# Patient Record
Sex: FEMALE | Race: ASIAN | NOT HISPANIC OR LATINO | URBAN - METROPOLITAN AREA
[De-identification: names, ages, dates, MRNs, and addresses within clinical notes are randomized per-mention and may not be internally consistent; named-entity substitution may affect disease eponyms.]

---

## 2020-12-21 ENCOUNTER — INPATIENT (INPATIENT)
Facility: HOSPITAL | Age: 65
LOS: 14 days | Discharge: HOME HEALTH | DRG: 949 | End: 2021-01-05
Attending: PHYSICAL MEDICINE & REHABILITATION | Admitting: PHYSICAL MEDICINE & REHABILITATION
Payer: MEDICARE

## 2020-12-21 VITALS
OXYGEN SATURATION: 96 % | WEIGHT: 117.29 LBS | DIASTOLIC BLOOD PRESSURE: 69 MMHG | RESPIRATION RATE: 16 BRPM | SYSTOLIC BLOOD PRESSURE: 119 MMHG | TEMPERATURE: 98 F | HEART RATE: 76 BPM

## 2020-12-21 DIAGNOSIS — I63.9 CEREBRAL INFARCTION, UNSPECIFIED: ICD-10-CM

## 2020-12-21 RX ORDER — DEXTROSE 50 % IN WATER 50 %
25 SYRINGE (ML) INTRAVENOUS ONCE
Refills: 0 | Status: DISCONTINUED | OUTPATIENT
Start: 2020-12-21 | End: 2021-01-05

## 2020-12-21 RX ORDER — SODIUM CHLORIDE 9 MG/ML
1000 INJECTION, SOLUTION INTRAVENOUS
Refills: 0 | Status: DISCONTINUED | OUTPATIENT
Start: 2020-12-21 | End: 2021-01-05

## 2020-12-21 RX ORDER — TICAGRELOR 90 MG/1
90 TABLET ORAL
Refills: 0 | Status: DISCONTINUED | OUTPATIENT
Start: 2020-12-21 | End: 2021-01-05

## 2020-12-21 RX ORDER — LEVOTHYROXINE SODIUM 125 MCG
75 TABLET ORAL DAILY
Refills: 0 | Status: DISCONTINUED | OUTPATIENT
Start: 2020-12-21 | End: 2021-01-05

## 2020-12-21 RX ORDER — INSULIN GLARGINE 100 [IU]/ML
12 INJECTION, SOLUTION SUBCUTANEOUS AT BEDTIME
Refills: 0 | Status: DISCONTINUED | OUTPATIENT
Start: 2020-12-21 | End: 2020-12-22

## 2020-12-21 RX ORDER — DEXTROSE 50 % IN WATER 50 %
12.5 SYRINGE (ML) INTRAVENOUS ONCE
Refills: 0 | Status: DISCONTINUED | OUTPATIENT
Start: 2020-12-21 | End: 2021-01-05

## 2020-12-21 RX ORDER — DEXTROSE 50 % IN WATER 50 %
15 SYRINGE (ML) INTRAVENOUS ONCE
Refills: 0 | Status: DISCONTINUED | OUTPATIENT
Start: 2020-12-21 | End: 2021-01-05

## 2020-12-21 RX ORDER — GLUCAGON INJECTION, SOLUTION 0.5 MG/.1ML
1 INJECTION, SOLUTION SUBCUTANEOUS ONCE
Refills: 0 | Status: DISCONTINUED | OUTPATIENT
Start: 2020-12-21 | End: 2021-01-05

## 2020-12-21 RX ORDER — INSULIN LISPRO 100/ML
4 VIAL (ML) SUBCUTANEOUS
Refills: 0 | Status: DISCONTINUED | OUTPATIENT
Start: 2020-12-21 | End: 2020-12-22

## 2020-12-21 RX ORDER — ASPIRIN/CALCIUM CARB/MAGNESIUM 324 MG
81 TABLET ORAL DAILY
Refills: 0 | Status: DISCONTINUED | OUTPATIENT
Start: 2020-12-21 | End: 2021-01-05

## 2020-12-21 RX ORDER — AMLODIPINE BESYLATE 2.5 MG/1
5 TABLET ORAL DAILY
Refills: 0 | Status: DISCONTINUED | OUTPATIENT
Start: 2020-12-21 | End: 2020-12-25

## 2020-12-21 RX ORDER — LISINOPRIL 2.5 MG/1
40 TABLET ORAL DAILY
Refills: 0 | Status: DISCONTINUED | OUTPATIENT
Start: 2020-12-21 | End: 2021-01-05

## 2020-12-21 RX ORDER — INSULIN LISPRO 100/ML
VIAL (ML) SUBCUTANEOUS
Refills: 0 | Status: DISCONTINUED | OUTPATIENT
Start: 2020-12-21 | End: 2020-12-23

## 2020-12-21 RX ORDER — PANTOPRAZOLE SODIUM 20 MG/1
40 TABLET, DELAYED RELEASE ORAL
Refills: 0 | Status: DISCONTINUED | OUTPATIENT
Start: 2020-12-21 | End: 2021-01-05

## 2020-12-21 RX ORDER — ACETAMINOPHEN 500 MG
650 TABLET ORAL EVERY 6 HOURS
Refills: 0 | Status: DISCONTINUED | OUTPATIENT
Start: 2020-12-21 | End: 2021-01-05

## 2020-12-21 RX ORDER — POLYETHYLENE GLYCOL 3350 17 G/17G
17 POWDER, FOR SOLUTION ORAL DAILY
Refills: 0 | Status: DISCONTINUED | OUTPATIENT
Start: 2020-12-21 | End: 2021-01-05

## 2020-12-21 RX ORDER — ALBUTEROL 90 UG/1
1 AEROSOL, METERED ORAL EVERY 4 HOURS
Refills: 0 | Status: DISCONTINUED | OUTPATIENT
Start: 2020-12-21 | End: 2021-01-05

## 2020-12-21 RX ORDER — SENNA PLUS 8.6 MG/1
2 TABLET ORAL AT BEDTIME
Refills: 0 | Status: DISCONTINUED | OUTPATIENT
Start: 2020-12-21 | End: 2021-01-05

## 2020-12-21 RX ORDER — ATORVASTATIN CALCIUM 80 MG/1
80 TABLET, FILM COATED ORAL AT BEDTIME
Refills: 0 | Status: DISCONTINUED | OUTPATIENT
Start: 2020-12-21 | End: 2021-01-05

## 2020-12-21 RX ORDER — CIPROFLOXACIN LACTATE 400MG/40ML
500 VIAL (ML) INTRAVENOUS EVERY 12 HOURS
Refills: 0 | Status: DISCONTINUED | OUTPATIENT
Start: 2020-12-21 | End: 2020-12-22

## 2020-12-21 RX ORDER — ENOXAPARIN SODIUM 100 MG/ML
40 INJECTION SUBCUTANEOUS DAILY
Refills: 0 | Status: DISCONTINUED | OUTPATIENT
Start: 2020-12-21 | End: 2021-01-05

## 2020-12-21 RX ADMIN — ATORVASTATIN CALCIUM 80 MILLIGRAM(S): 80 TABLET, FILM COATED ORAL at 23:12

## 2020-12-21 RX ADMIN — INSULIN GLARGINE 12 UNIT(S): 100 INJECTION, SOLUTION SUBCUTANEOUS at 23:24

## 2020-12-21 RX ADMIN — Medication 5: at 23:21

## 2020-12-21 RX ADMIN — SENNA PLUS 2 TABLET(S): 8.6 TABLET ORAL at 23:12

## 2020-12-21 NOTE — H&P ADULT - REASON FOR ADMISSION
01.2 Bilateral Involvement  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 Left [place] CVA with left sided weakness of upper extremities and bilateral lower extremity weakness  Impairment code: 01.3 Bilateral Involvement

## 2020-12-21 NOTE — H&P ADULT - NSHPPHYSICALEXAM_GEN_ALL_CORE
Vital Signs  T(C): --  HR: --  BP: --  RR: --  SpO2: --    Gen - NAD, Comfortable  HEENT - NCAT, EOMI, MMM  Neck - Supple, No limited ROM  Pulm - CTAB, No wheeze, No rhonchi, No crackles  Cardiovascular - RRR, S1S2, No m/r/g  Abdomen - Soft, NT/ND, +BS  Extremities - No C/C/E, No calf tenderness  Neuro-     Cognitive - AAOx3     Communication - Fluent, No dysarthria     Attention: Intact      Judgement: Good evidence of judgement     Memory: Recall 3 objects immediate and 3 min later         Cranial Nerves - CN 2-12 intact     Motor -                    LEFT    UE - ShAB 5/5, EF 5/5, EE 5/5, WE 5/5,  5/5                    RIGHT UE - ShAB 5/5, EF 5/5, EE 5/5, WE 5/5,  5/5                    LEFT    LE - HF 5/5, KE 5/5, DF 5/5, PF 5/5                    RIGHT LE - HF 5/5, KE 5/5, DF 5/5, PF 5/5        Sensory - Intact to LT     Reflexes - DTR Intact, No primitive reflex     Coordination - FTN intact     Tone - normal  Psychiatric - Mood stable, Affect WNL  Skin: Constitutional - NAD, Comfortable  HEENT - NCAT, EOMI  Neck - Supple, No limited ROM  Chest - good chest expansion, good respiratory effort  Cardio - warm and well perfused, RRR  Abdomen -  Soft, NTND  Extremities - No peripheral edema, No calf tenderness   Neurologic Exam:                    Cognitive -             Orientation: Awake, Alert, AAO to self, place, date, year, situation            Memory: Recent/ remote memory intact            Thought: process, content appropriate     Speech - Fluent, Comprehensible, +slight dysarthria, No aphasia      Cranial Nerves - No facial asymmetry, Tongue midline, EOMI, Shoulder shrug intact     Motor -                      LEFT    UE - ShAB 4/5, EF 4/5, EE 4/5, WE 4/5,  diminished compared to right                    RIGHT UE - ShAB 5/5, EF 5/5, EE 5/5, WE 5/5,  WNL                    LEFT    LE - HF 2/5, KE 3/5, DF 3/5, PF 3/5 -- increased tone                    RIGHT LE - HF 5/5, KE 5/5, DF 5/5, PF 5/5        Sensory - Intact to LT bilateral     Coordination - impaired on left side  Psychiatric - Mood stable, Affect WNL Constitutional - NAD, eyes open, follows simple verbal commands, reduced 2 step command follow  +reduced initiation +expressive difficulties/decreased fluency. Salient content preserved  HEENT - NCAT, EOMI +right facial droop   Neck - Supple, No limited ROM  Chest - good chest expansion, good respiratory effort  Cardio - warm and well perfused, RRR  Abdomen -  Soft, NTND  Extremities - No peripheral edema, No calf tenderness . No erythema or warmth. tightness in heel cords, left > right  Neurologic Exam:                    Cognitive -             Orientation: Awake, Alert, AAO to self, place, date, year, situation            Memory: 3/3 immediate recall. 1/3 independent after 5 minutes, 2/3 with cues            Speech - +reduced initiation and processing, +slight dysarthria, +reduced fluency     Cranial Nerves - +right facial droop EOMI. sl left tongue deviation.  Shoulder shrug intact     Motor -                      LEFT    UE - ShAB 4/5, EF 4/5, EE 4/5, WE 4/5,  diminished compared to right                    RIGHT UE - ShAB 5/5, EF 5/5, EE 5/5, WE 5/5,  WNL                    LEFT    LE - HF 2/5, KE 3/5, DF 3/5, PF 3/5 -- increased tone                    RIGHT LE - HF 5/5, KE 5/5, DF 5/5, PF 5/5        Sensory - Intact to LT bilateral     Coordination - impaired on left side, patient reports due to weakness, no dysmetria  Psychiatric - no lability or pseudobulbar affect, Affect flat

## 2020-12-21 NOTE — H&P ADULT - NSHPSOCIALHISTORY_GEN_ALL_CORE
ETOH- denies  Smoking - denies  Drugs - denies    Current Functional Status    Functional Mobility: Per PT note 12/20/20 Supine to sit Min A x 1 Sit to stand Min A x 1 with RW  Ambulation 20'x2 with RW and Min A; flexed posture, increased trunk sway, slow codie  Per PT note 12/17/20 Supine/sit Min A x 1 Sit/stand Min A x 1 Ambulation 30' with RW and Min A for L LE swing and balance  Per PT eval 12/15/20 Supine/sit Mod A; further participation limited by orthostasis  Self Care:  Per OT note 12/20/20 Toilet transfers Min A Toilet hygiene Min A Grooming CG at sink UE dressing Min A  Per OT note 12/17/20 ADLs not assessed B UE therex A/AROM  Per OT eval 12/15/20 UE dressing Min A Bathing Min A Toileting Min A LE dressing Min A Grooming Min A Eating Min A	  Swallowing:   SLP 12/17/20 Patient presents with oropharyngeal dysphagia (R13.12) characterized by prolonged mastication of solids, delayed initiation of tongue motion for oral transport, minimal laryngeal elevation and incomplete laryngeal vestibular closure at the height of the swallow. Silent aspiration is visualized during the swallow of thin liquid via  teaspoon and cup. Thin liquid barium is aspirated at least partly via the posterior aspect of the endolarynx, despite a timely pharyngeal swallow response with thin liquid via teaspoon. MRA brain findings noted "soft tissue fullness in the left nasopharynx". ENT consult is recommended to rule out anatomic abnormality as etiology of pharyngeal dysphagia/aspiration. Otherwise suspect oropharyngeal dysphagia is related to history of brainstem stroke. Recommend:  1. Soft-to-chew diet with nectar thick liquids 2. Nectar thick liquids via small single sips 3. Strict aspiration precautions including upright positioning during  Cognition: A&Ox3, follows all commands with decreased reaction time   Communication: Patient displays the following communication impairments: Dysarthria. ETOH- denies  Smoking - denies  Drugs - denies    Current Functional Status    Functional Mobility: Per PT note 12/20/20 Supine to sit Min A x 1 Sit to stand Min A x 1 with RW  Ambulation 20'x2 with RW and Min A; flexed posture, increased trunk sway, slow codie      Self Care:  Per OT note 12/20/20 Toilet transfers Min A Toilet hygiene Min A Grooming CG at sink UE dressing Min A      Swallowing:   SLP 12/17/20 Patient presents with oropharyngeal dysphagia (R13.12) characterized by prolonged mastication of solids, delayed initiation of tongue motion for oral transport, minimal laryngeal elevation and incomplete laryngeal vestibular closure at the height of the swallow. Silent aspiration is visualized during the swallow of thin liquid via  teaspoon and cup. Thin liquid barium is aspirated at least partly via the posterior aspect of the endolarynx, despite a timely pharyngeal swallow response with thin liquid via teaspoon.  . Recommend:  1. Soft-to-chew diet with nectar thick liquids 2. Nectar thick liquids via small single sips 3. Strict aspiration precautions including upright positioning during  Cognition: A&Ox3, follows all commands with decreased reaction time   Communication: Patient displays the following communication impairments: Dysarthria. ETOH- denies  Smoking - denies  Drugs - denies  Patient states she performed bADLs independently. Ambulates without assistive device, no AFO at home, but primarily indoors, no outdoor ambulation    Current Functional Status    Functional Mobility: Per PT note 12/20/20 Supine to sit Min A x 1 Sit to stand Min A x 1 with RW  Ambulation 20'x2 with RW and Min A; flexed posture, increased trunk sway, slow codie      Self Care:  Per OT note 12/20/20 Toilet transfers Min A Toilet hygiene Min A Grooming CG at sink UE dressing Min A      Swallowing:   SLP 12/17/20 Patient presents with oropharyngeal dysphagia (R13.12) characterized by prolonged mastication of solids, delayed initiation of tongue motion for oral transport, minimal laryngeal elevation and incomplete laryngeal vestibular closure at the height of the swallow. Silent aspiration is visualized during the swallow of thin liquid via  teaspoon and cup. Thin liquid barium is aspirated at least partly via the posterior aspect of the endolarynx, despite a timely pharyngeal swallow response with thin liquid via teaspoon.  . Recommend:  1. Soft-to-chew diet with nectar thick liquids 2. Nectar thick liquids via small single sips 3. Strict aspiration precautions including upright positioning during  Cognition: A&Ox3, follows all commands with decreased reaction time   Communication: Patient displays the following communication impairments: Dysarthria.

## 2020-12-21 NOTE — H&P ADULT - HISTORY OF PRESENT ILLNESS
Pt is a 65 year old female with PMH including HTN, HLD, +LOOP, asthma, hypothyroidism, recent UTI, DM, stroke x3, last in 2018 with residual left sided weakness and mild slurring, who presented to [facility] on 12/14/20 with worsening left sided weakness and slurring. Son noted that fell 3 times on the day of admission, but pt herself had no complaints of pain. Imaging revealed acute L pontine infarct. ENT consulted 2’ asymmetry in the nasopharynx on CT; no pathology noted on fiberoptic laryngoscopy, B TVC movement noted, no upper airway obstruction noted, no ENT surgical intervention necessary. Patient was stable for discharge to [facility] for Acute inpatient rehab on 12/21/20. Patient is a 65 year old female with PMH including HTN, HLD, +LOOP, asthma, hypothyroidism, DM, CVA x 3 (last in 2018) with residual left sided weakness and mild dysarthria, who presented to [facility] on 12/14/20 with worsening left sided weakness and slurring. Son also reported that the patient fell 3 times on the day of admission; patient herself denied pain. Imaging revealed acute L pontine infarct. Motor strength 3/5 LUE, 4/5 LLE, 5/5 . Intact sensation. Cleared by SLP for soft solid, nectar-thick liquid diet. Patient is on ASA, tigraglecor and atorvastatin.    Hospital Course complicated by UTI on course of PO Cipro 500 q12h. ENT was consulted for noted asymmetry in the nasopharynx on CT; no pathology noted on fiberoptic laryngoscopy. Bilateral TVC movement was noted, with no upper airway obstruction. No ENT surgical intervention recommended. Patient was stabilized and discharged to [facility] for Acute inpatient rehab on 12/21/20. Patient is a 65 year old female RH-dominant with PMH including HTN, HLD, +LOOP, asthma, hypothyroidism, DM, CVA x 3 (last in 2018) with residual left sided weakness and mild dysarthria, who presented to [facility] on 12/14/20 with worsening left sided weakness and slurring. Son also reported that the patient fell 3 times on the day of admission; patient herself denied pain. Imaging revealed acute L pontine infarct. Motor strength 3/5 LUE, 4/5 LLE, 5/5 . Intact sensation. Cleared by SLP for soft solid, nectar-thick liquid diet. Patient is on ASA, tigraglecor and atorvastatin.    Hospital Course complicated by UTI on course of PO Cipro 500 q12h. ENT was consulted for noted asymmetry in the nasopharynx on CT; no pathology noted on fiberoptic laryngoscopy. Bilateral TVC movement was noted, with no upper airway obstruction. No ENT surgical intervention recommended. Patient was stabilized and discharged to [facility] for Acute inpatient rehab on 12/21/20.

## 2020-12-21 NOTE — H&P ADULT - NSHPLABSRESULTS_GEN_ALL_CORE
12/15/20 EEG This is an abnormal awake and drowsy routine EEG. There was non-specific left hemisphere cerebral dysfunction which may be related to underlying structural lesion. No epileptiform discharge noted.      MRI Brain 12/16/20 Definite small acute infarct in the anterior left kirstin. No acute intracranial hemorrhage. Chronic ischemic changes as above. Moderate parenchymal volume loss, progressed since 2011.     CT Head 12/14/20 1. No evidence of acute territorial infarct or intracranial hemorrhage. Vascular calcifications of bilateral internal carotid and vertebral arteries.      CTA Head/Neck 12/14/20 Atretic and irregular left proximal PCA, unchanged from 2009, otherwise no large vessel occlusion or high-grade stenosis. Mild to moderate vertebrobasilar narrowing. Mild short segment narrowing of the proximal left cervical ICA. Soft tissue fullness in the left nasopharynx. Recommend direct inspection of this region to exclude neoplasm. 12/15/20 EEG This is an abnormal awake and drowsy routine EEG. There was non-specific left hemisphere cerebral dysfunction which may be related to underlying structural lesion. No epileptiform discharge noted.      MRI Brain 20 Definite small acute infarct in the anterior left kirstin. No acute intracranial hemorrhage. Chronic ischemic changes as above. Moderate parenchymal volume loss, progressed since .     CT Head 20 1. No evidence of acute territorial infarct or intracranial hemorrhage. Vascular calcifications of bilateral internal carotid and vertebral arteries.      CTA Head/Neck 20 Atretic and irregular left proximal PCA, unchanged from , otherwise no large vessel occlusion or high-grade stenosis. Mild to moderate vertebrobasilar narrowing. Mild short segment narrowing of the proximal left cervical ICA. Soft tissue fullness in the left nasopharynx. Recommend direct inspection of this region to exclude neoplasm.  RECENT LABS    Vital Signs Last 24 Hrs  T(C): 36.6 (22 Dec 2020 09:35), Max: 36.9 (21 Dec 2020 21:54)  T(F): 97.9 (22 Dec 2020 09:35), Max: 98.4 (21 Dec 2020 21:54)  HR: 62 (22 Dec 2020 09:35) (62 - 76)  BP: 139/68 (22 Dec 2020 09:35) (119/69 - 139/68)  BP(mean): --  RR: 16 (22 Dec 2020 09:35) (16 - 16)  SpO2: 96% (22 Dec 2020 09:35) (96% - 96%)                          12.5   8.34  )-----------( 160      ( 22 Dec 2020 05:00 )             37.1         140  |  104  |  23  ----------------------------<  260<H>  3.5   |  29  |  0.70    Ca    9.2      22 Dec 2020 05:00    TPro  7.0  /  Alb  3.5  /  TBili  0.5  /  DBili  x   /  AST  29  /  ALT  60<H>  /  AlkPhos  78        Urinalysis Basic - ( 22 Dec 2020 12:05 )    Color: Yellow / Appearance: very cloudy / S.025 / pH: x  Gluc: x / Ketone: Trace  / Bili: Negative / Urobili: Negative   Blood: x / Protein: 15 / Nitrite: Negative   Leuk Esterase: Negative / RBC: >50 /HPF / WBC 0-2 /HPF   Sq Epi: x / Non Sq Epi: Neg.-Few / Bacteria: Negative /HPF      CAPILLARY BLOOD GLUCOSE      POCT Blood Glucose.: 351 mg/dL (22 Dec 2020 11:58)  POCT Blood Glucose.: 273 mg/dL (22 Dec 2020 07:49)  POCT Blood Glucose.: 352 mg/dL (21 Dec 2020 23:18)

## 2020-12-21 NOTE — H&P ADULT - ASSESSMENT
Assessment/Plan:  SUNG, MAY is a 65y F with a history of HTN, HLD, +LOOP, asthma, hypothyroidism, recent UTI, DM, stroke x3, last in 2018 with residual left sided weakness and mild slurring who presented to [facility]/[place] on 12/14/20 with worsening left sided weakness, bilateral lower extremity weakness and slurring, found to have Acute Left Pontine Stroke.  Hospital Course complicated by UTI on course of PO Cipro 500 q12h, dysphagia, ENT cleared the patient - no oropharyngeal pathology. Patient now admitted for a multidisciplinary rehab program. 12-21-20 @ 14:51    Comprehensive Multidisciplinary Rehab Program:  - Start comprehensive rehab program of PT/OT/SLP - 3 hours a day, 5 days a week  - Orthotics  as needed     -------------    ##L Acute Pontine Stroke   - left facial droop, LUE weakness, and b/l LE weakness  - Gait Instability, ADL impairments and Functional impairments: start Comprehensive Rehab Program of PT/OT/SLP and Orthotics as needed. 3 hours a day for 5 days a week.  - ASA 81mg po daily , , ticagrelor 90mg po q12h , Atorvastatin 80mg po qhs  - Dysphagia, cleared by ENT for acute structural pathologies  - Outside permissive HTN stage    #HTN  - Lisinopril 40mg po daily   - amolodipine 5mg po daily    #hypothyroidism   - TFT on admissions  - Levothyroxine 75mcg po daily    #DM  - A1C on admission  - lantus 12 unit sub Q  - lispro 4/4/4 premeals  - ISS    #Asthma  - albuterol MDI 2 pulls inhalation q4h PRN    #Sleep  - will start melatonin PRN if patient c/o sleep difficulties    #Skin  - Pressure injury/Skin: OOB to Chair, PT/OT     #Pain Mgmt   - Tylenol PRN    #GI/Bowel Mgmt   - Continent c/w Senna, Miralax PRN    #/Bladder Mgmt   - Continent, PVR per routiune    #COVID-19 Surveillance  - Negatives: 12/14, 12/18    #FEN   - Diet - Soft Diet, nectar thick liquids  [CCHO, DASH/TLC]    - Dysphagia  SLP - evaluation and treatment  - Aspiration precautions    #Precautions / PROPHYLAXIS:   - Falls, Aspiration  - Lungs: Aspiration  - DVT: Lovenox      MEDICAL PROGNOSIS: GOOD                                   REHAB POTENTIAL: GOOD  ESTIMATED DISPOSITION: HOME                             ELOS: 10-14 Days   EXPECTED THERAPY:     P.T. 1hr/day       O.T. 1hr/day      S.L.P. 1hr/day      EXP FREQUENCY: 5 days per 7 day period     PRESCREEN COMPARISON: I have reviewed the prescreen information and I have found no relevant changes between the preadmission screening and my post admission evaluation     RATIONALE FOR INPATIENT ADMISSION - Patient demonstrates the following: (check all that apply)  [X] Medically appropriate for rehabilitation admission  [X] Has attainable rehab goals with an appropriate initial discharge plan  [X] Has rehabilitation potential (expected to make a significant improvement within a reasonable period of time)   [X] Requires close medical managment by a rehab physician, rehab nursing care, Hospitalist and comprehensive interdisciplinary team (including PT, OT, & or SLP, Prosthetics and Orthotics)     JEFE CONSTANTINO is a 65y F with a history of HTN, DM, HLD, +LOOP, asthma, hypothyroidism, CVA who presented to [facility] on 12/14/20 with acute Left Pontine Stroke.    #Acute Pontine Stroke with left hemiparesis, dysarthria, dysphagia, and cognitive impairment  - begin Comprehensive Rehab Program of PT/OT/SLP  3 hours a day for 5 days a week  -neuropsychology evaluation cognition and mod  -continue secondary PPX with  ASA 81mg po daily, ticagrelor 90mg po q12h , Atorvastatin 80mg po qhs  - Dysphagia: cleared by ENT for acute structural pathologies. Currently on soft solids and nectar-thick liquids  - Outside permissive HTN stage  -Precautions: cardiac, DM, aspiration, fall, AMS    #HTN  - Lisinopril 40mg po daily   - amolodipine 5mg po daily  -hospitalist consult, monitor vitals    #hypothyroidism   - TFT on admissions  - Levothyroxine 75mcg po daily  -hospitalist consult    #DM  -order A1C on admission  - lantus 12 unit sub Q  - lispro 4units premeals  - ISS coverage  -nutrition and hosptialist consult    #Asthma  - albuterol MDI 2 pulls inhalation q4h PRN    #Sleep  - will start melatonin PRN if patient c/o sleep difficulties    #Skin  - Pressure injury/Skin: OOB to Chair, PT/OT     #Pain Mgmt   - Tylenol PRN    #GI/Bowel Mgmt   -  Senna, Miralax PRN    #/Bladder Mgmt   - bladder scan, PVR per routiune  -s/p Rx UTI with cipro  -optimize hydration, address constipation, mobility    #COVID-19 Surveillance  - Negatives: 12/14, 12/18    #FEN   - Diet - Soft Diet, nectar thick liquids  [CCHO, DASH/TLC]    - Dysphagia  SLP - evaluation and treatment  - Aspiration precautions    #DVT:   -Lovenox  -SCD TEDs    MEDICAL PROGNOSIS: GOOD                                   REHAB POTENTIAL: GOOD-  ESTIMATED DISPOSITION: HOME                             ELOS: 14-17 Days   EXPECTED THERAPY:     P.T. 1hr/day       O.T. 1hr/day      S.L.P. 1hr/day      EXP FREQUENCY: 5 days per 7 day period     PRESCREEN COMPARISON: I have reviewed the prescreen information and I have found no relevant changes between the preadmission screening and my post admission evaluation     RATIONALE FOR INPATIENT ADMISSION - Patient demonstrates the following: (check all that apply)  [X] Medically appropriate for rehabilitation admission  [X] Has attainable rehab goals with an appropriate initial discharge plan  [X] Has rehabilitation potential (expected to make a significant improvement within a reasonable period of time)   [X] Requires close medical managment by a rehab physician, rehab nursing care, Hospitalist and comprehensive interdisciplinary team (including PT, OT, & or SLP, Prosthetics and Orthotics)     JEFE CONSTANTINO is a 65y F with a history of HTN, DM, HLD, +LOOP, asthma, hypothyroidism, CVA who presented to [facility] on 12/14/20 with acute Left Pontine Stroke.    #Acute Pontine Stroke with left upper and bilateral lower extremity weakness, dysarthria, dysphagia, and cognitive impairment  - begin Comprehensive Rehab Program of PT/OT/SLP  3 hours a day for 5 days a week  -neuropsychology evaluation cognition and mod  -continue secondary PPX with  ASA 81mg po daily, ticagrelor 90mg po q12h , Atorvastatin 80mg po qhs  - Dysphagia: cleared by ENT for acute structural pathologies. Currently on soft solids and nectar-thick liquids  - Outside permissive HTN stage  -Precautions: cardiac, DM, aspiration, fall, AMS    #HTN  - Lisinopril 40mg po daily   - amolodipine 5mg po daily  -hospitalist consult, monitor vitals    #hypothyroidism   - TFT on admissions  - Levothyroxine 75mcg po daily  -hospitalist consult    #DM  -order A1C on admission  - lantus 12 unit sub Q  - lispro 4units premeals  - ISS coverage  -nutrition and hosptialist consult    #Asthma  - albuterol MDI 2 pulls inhalation q4h PRN    #Sleep  - will start melatonin PRN if patient c/o sleep difficulties    #Skin  - Pressure injury/Skin: OOB to Chair, PT/OT     #Pain Mgmt   - Tylenol PRN    #GI/Bowel Mgmt   -  Senna, Miralax PRN    #/Bladder Mgmt   - bladder scan, PVR per routiune  -s/p Rx UTI with cipro  -optimize hydration, address constipation, mobility    #COVID-19 Surveillance  - Negatives: 12/14, 12/18    #FEN   - Diet - Soft Diet, nectar thick liquids  [CCHO, DASH/TLC]    - Dysphagia  SLP - evaluation and treatment  - Aspiration precautions    #DVT:   -Lovenox  -SCD TEDs    MEDICAL PROGNOSIS: GOOD                                   REHAB POTENTIAL: GOOD-  ESTIMATED DISPOSITION: HOME                             ELOS: 14-17 Days   EXPECTED THERAPY:     P.T. 1hr/day       O.T. 1hr/day      S.L.P. 1hr/day      EXP FREQUENCY: 5 days per 7 day period     PRESCREEN COMPARISON: I have reviewed the prescreen information and I have found no relevant changes between the preadmission screening and my post admission evaluation     RATIONALE FOR INPATIENT ADMISSION - Patient demonstrates the following: (check all that apply)  [X] Medically appropriate for rehabilitation admission  [X] Has attainable rehab goals with an appropriate initial discharge plan  [X] Has rehabilitation potential (expected to make a significant improvement within a reasonable period of time)   [X] Requires close medical managment by a rehab physician, rehab nursing care, Hospitalist and comprehensive interdisciplinary team (including PT, OT, & or SLP, Prosthetics and Orthotics)     JEFE CONSTANTINO is a 65y F with a history of HTN, DM, HLD, +LOOP, asthma, hypothyroidism, CVA who presented to [facility] on 12/14/20 with acute Left Pontine Stroke.    #Acute Pontine Stroke with left upper and bilateral lower extremity weakness, dysarthria, dysphagia, and cognitive impairment  - begin Comprehensive Rehab Program of PT/OT/SLP  3 hours a day for 5 days a week  -neuropsychology evaluation cognition and mod  -continue secondary PPX with  ASA 81mg po daily, ticagrelor 90mg po q12h , Atorvastatin 80mg po qhs  - Dysphagia: cleared by ENT for acute structural pathologies. Currently on soft solids and nectar-thick liquids  - Outside permissive HTN stage  -Precautions: cardiac, DM, aspiration, fall, AMS    #HTN  - Lisinopril 40mg po daily   - amolodipine 5mg po daily  -hospitalist consult, monitor vitals  -(119/69 - 139/68) 12/22 stable    #hypothyroidism   - TFT on admissions  - Levothyroxine 75mcg po daily  -hospitalist consult    #DM  -order A1C on admission  - lantus 12 unit sub Q  - lispro 4units premeals  - ISS coverage  -nutrition and hosptialist consult    #Asthma  - albuterol MDI 2 pulls inhalation q4h PRN    #Sleep  - will start melatonin PRN if patient c/o sleep difficulties    #Skin  - Pressure injury/Skin: OOB to Chair, PT/OT     #Pain Mgmt   - Tylenol PRN    #GI/Bowel Mgmt   -  Senna, Miralax PRN    #/Bladder Mgmt   - bladder scan, PVR per routiune  -s/p Rx UTI with cipro x 3 days. UA+ 12/22, will send urine Cx. currently asx, afebrile, no leukocytosis  -optimize hydration, address constipation, mobility    #COVID-19 Surveillance  - Negatives: 12/14, 12/18, 12/21    #FEN   - Diet - Soft Diet, nectar thick liquids  [CCHO, DASH/TLC]    - Dysphagia  SLP - evaluation and treatment  - Aspiration precautions    #DVT:   -Lovenox  -SCD TEDs    #admission labs 12/22 reviewed, stable    #LABS  urine Cx  HgA1C  CBC BMP 12/24    MEDICAL PROGNOSIS: GOOD                                   REHAB POTENTIAL: GOOD-  ESTIMATED DISPOSITION: HOME                             ELOS: 14-17 Days   EXPECTED THERAPY:     P.T. 1hr/day       O.T. 1hr/day      S.L.P. 1hr/day      EXP FREQUENCY: 5 days per 7 day period     PRESCREEN COMPARISON: I have reviewed the prescreen information and I have found no relevant changes between the preadmission screening and my post admission evaluation     RATIONALE FOR INPATIENT ADMISSION - Patient demonstrates the following: (check all that apply)  [X] Medically appropriate for rehabilitation admission  [X] Has attainable rehab goals with an appropriate initial discharge plan  [X] Has rehabilitation potential (expected to make a significant improvement within a reasonable period of time)   [X] Requires close medical managment by a rehab physician, rehab nursing care, Hospitalist and comprehensive interdisciplinary team (including PT, OT, & or SLP, Prosthetics and Orthotics)     JEFE CONSTANTINO is a 65y F with a history of HTN, DM, HLD, +LOOP, asthma, hypothyroidism, CVA who presented to [facility] on 12/14/20 with acute Left Pontine Stroke.    #Acute Pontine Stroke with left upper and bilateral lower extremity weakness, dysarthria, dysphagia, and cognitive impairment  - begin Comprehensive Rehab Program of PT/OT/SLP  3 hours a day for 5 days a week  -neuropsychology evaluation cognition and mod  -continue secondary PPX with  ASA 81mg po daily, ticagrelor 90mg po q12h , Atorvastatin 80mg po qhs  - Dysphagia: cleared by ENT for acute structural pathologies. Currently on soft solids and nectar-thick liquids  - Outside permissive HTN stage  -Precautions: cardiac, DM, aspiration, fall, AMS    #HTN  - Lisinopril 40mg po daily   - amolodipine 5mg po daily  -hospitalist consult, monitor vitals  -(119/69 - 139/68) 12/22 stable    #hypothyroidism   - TFT on admissions  - Levothyroxine 75mcg po daily  -hospitalist consult    #DM  -order A1C   - lantus 12 unit sub Q-->increased to 20 units qhs 12/22  - lispro 4units premeals-->increased to 7 units qAC 12/22  - ISS coverage  -nutrition and hosptialist consult. FS not controlled, 200-300s , regimen adjusted    #Asthma  - albuterol MDI 2 pulls inhalation q4h PRN  -stable    #Skin  - Pressure injury/Skin: OOB to Chair, PT/OT     #Pain Mgmt   - Tylenol PRN    #GI/Bowel Mgmt   -  Senna, Miralax PRN    #/Bladder Mgmt   - bladder scan, PVR per routiune  -s/p Rx UTI with cipro x 3 days. UA+ 12/22, will send urine Cx. currently asx, afebrile, no leukocytosis. Continue cipro x 5 more doses, hospitalist appreciated  -optimize hydration, address constipation, mobility    #COVID-19 Surveillance  - Negatives: 12/14, 12/18, 12/21    #FEN   - Diet - Soft Diet, nectar thick liquids  [CCHO, DASH/TLC]    - Dysphagia  SLP - evaluation and treatment  - Aspiration precautions    #DVT:   -Lovenox  -SCD TEDs    #admission labs 12/22 reviewed, stable    #LABS  urine Cx  HgA1C  montior FS  CBC BMP 12/24    MEDICAL PROGNOSIS: GOOD                                   REHAB POTENTIAL: GOOD-  ESTIMATED DISPOSITION: HOME                             ELOS: 14-17 Days   EXPECTED THERAPY:     P.T. 1hr/day       O.T. 1hr/day      S.L.P. 1hr/day      EXP FREQUENCY: 5 days per 7 day period     PRESCREEN COMPARISON: I have reviewed the prescreen information and I have found no relevant changes between the preadmission screening and my post admission evaluation     RATIONALE FOR INPATIENT ADMISSION - Patient demonstrates the following: (check all that apply)  [X] Medically appropriate for rehabilitation admission  [X] Has attainable rehab goals with an appropriate initial discharge plan  [X] Has rehabilitation potential (expected to make a significant improvement within a reasonable period of time)   [X] Requires close medical managment by a rehab physician, rehab nursing care, Hospitalist and comprehensive interdisciplinary team (including PT, OT, & or SLP, Prosthetics and Orthotics)

## 2020-12-21 NOTE — H&P ADULT - NSHPREVIEWOFSYSTEMS_GEN_ALL_CORE
REVIEW OF SYSTEMS  Constitutional: No fever, No Chills, No fatigue  HEENT: No eye pain, No visual disturbances, No difficulty hearing, No Dysphagia   Pulm: No cough,  No shortness of breath  Cardio: No chest pain, No palpitations  GI:  No abdominal pain, No nausea, No vomiting, No diarrhea, No constipation, No incontinence, Last Bowel Movement on   : No dysuria, No frequency, No hematuria, No incontinence  Neuro: No headaches, No memory loss, No loss of strength, No numbness, No tremors  Skin: No itching, No rashes, No lesions   Endo: No temperature intolerance  MSK: No joint pain, No joint swelling, No muscle pain, No Neck or back pain  Psych:  No depression, No anxiety REVIEW OF SYSTEMS  Constitutional: No fever, No Chills, +fatigue  HEENT: No eye pain, No visual disturbances, No difficulty hearing, No Dysphagia   Pulm: No cough,  No shortness of breath  Cardio: No chest pain, No palpitations  GI:  No abdominal pain, No nausea, No vomiting, No diarrhea, No constipation, No incontinence, Last Bowel Movement 12/21  : No dysuria, No frequency, No hematuria, No incontinence  Neuro: No headaches, No memory loss, +loss of strength, No numbness, No tremors  Skin: No itching, No rashes, No lesions   Endo: No temperature intolerance  MSK: No joint pain, No joint swelling, No muscle pain, No Neck or back pain  Psych:  No depression, No anxiety REVIEW OF SYSTEMS  Constitutional: No fever, No Chills, +fatigue Denies H/A, dizziness +change in vocal quality, lower volume and more higher pitched  HEENT: No eye pain, No visual disturbances, No difficulty hearing, No Dysphagia . Denies blurred vision or diplopia  Pulm: No cough,  No shortness of breath  Cardio: No chest pain, No palpitations  GI:  No abdominal pain, No nausea, No vomiting, No diarrhea, No constipation, No incontinence, Last Bowel Movement 12/21 +poor appetite  : No dysuria, No frequency, No hematuria, No incontinence  Neuro: No headaches, No memory loss, +loss of strength, No numbness, No tremors +let sided weakness residual but improved significantly, did not use AFO PTA  Skin: No itching, No rashes, No lesions     MSK: No joint pain, No joint swelling, No muscle pain, No Neck or back pain  denies insomnia, tearfulness, feeling down

## 2020-12-22 LAB
A1C WITH ESTIMATED AVERAGE GLUCOSE RESULT: 7.5 % — HIGH (ref 4–5.6)
ALBUMIN SERPL ELPH-MCNC: 3.5 G/DL — SIGNIFICANT CHANGE UP (ref 3.3–5)
ALP SERPL-CCNC: 78 U/L — SIGNIFICANT CHANGE UP (ref 40–120)
ALT FLD-CCNC: 60 U/L — HIGH (ref 10–45)
ANION GAP SERPL CALC-SCNC: 7 MMOL/L — SIGNIFICANT CHANGE UP (ref 5–17)
APPEARANCE UR: ABNORMAL
AST SERPL-CCNC: 29 U/L — SIGNIFICANT CHANGE UP (ref 10–40)
BASOPHILS # BLD AUTO: 0.05 K/UL — SIGNIFICANT CHANGE UP (ref 0–0.2)
BASOPHILS NFR BLD AUTO: 0.6 % — SIGNIFICANT CHANGE UP (ref 0–2)
BILIRUB SERPL-MCNC: 0.5 MG/DL — SIGNIFICANT CHANGE UP (ref 0.2–1.2)
BILIRUB UR-MCNC: NEGATIVE — SIGNIFICANT CHANGE UP
BUN SERPL-MCNC: 23 MG/DL — SIGNIFICANT CHANGE UP (ref 7–23)
CALCIUM SERPL-MCNC: 9.2 MG/DL — SIGNIFICANT CHANGE UP (ref 8.4–10.5)
CHLORIDE SERPL-SCNC: 104 MMOL/L — SIGNIFICANT CHANGE UP (ref 96–108)
CO2 SERPL-SCNC: 29 MMOL/L — SIGNIFICANT CHANGE UP (ref 22–31)
COLOR SPEC: YELLOW — SIGNIFICANT CHANGE UP
CREAT SERPL-MCNC: 0.7 MG/DL — SIGNIFICANT CHANGE UP (ref 0.5–1.3)
DIFF PNL FLD: ABNORMAL
EOSINOPHIL # BLD AUTO: 0.19 K/UL — SIGNIFICANT CHANGE UP (ref 0–0.5)
EOSINOPHIL NFR BLD AUTO: 2.3 % — SIGNIFICANT CHANGE UP (ref 0–6)
ESTIMATED AVERAGE GLUCOSE: 169 MG/DL — HIGH (ref 68–114)
GLUCOSE SERPL-MCNC: 260 MG/DL — HIGH (ref 70–99)
GLUCOSE UR QL: 1000 MG/DL
HCT VFR BLD CALC: 37.1 % — SIGNIFICANT CHANGE UP (ref 34.5–45)
HCV AB S/CO SERPL IA: 0.06 S/CO — SIGNIFICANT CHANGE UP (ref 0–0.99)
HCV AB SERPL-IMP: SIGNIFICANT CHANGE UP
HGB BLD-MCNC: 12.5 G/DL — SIGNIFICANT CHANGE UP (ref 11.5–15.5)
IMM GRANULOCYTES NFR BLD AUTO: 0.5 % — SIGNIFICANT CHANGE UP (ref 0–1.5)
KETONES UR-MCNC: ABNORMAL
LEUKOCYTE ESTERASE UR-ACNC: NEGATIVE — SIGNIFICANT CHANGE UP
LYMPHOCYTES # BLD AUTO: 2.17 K/UL — SIGNIFICANT CHANGE UP (ref 1–3.3)
LYMPHOCYTES # BLD AUTO: 26 % — SIGNIFICANT CHANGE UP (ref 13–44)
MCHC RBC-ENTMCNC: 29 PG — SIGNIFICANT CHANGE UP (ref 27–34)
MCHC RBC-ENTMCNC: 33.7 GM/DL — SIGNIFICANT CHANGE UP (ref 32–36)
MCV RBC AUTO: 86.1 FL — SIGNIFICANT CHANGE UP (ref 80–100)
MONOCYTES # BLD AUTO: 0.52 K/UL — SIGNIFICANT CHANGE UP (ref 0–0.9)
MONOCYTES NFR BLD AUTO: 6.2 % — SIGNIFICANT CHANGE UP (ref 2–14)
NEUTROPHILS # BLD AUTO: 5.37 K/UL — SIGNIFICANT CHANGE UP (ref 1.8–7.4)
NEUTROPHILS NFR BLD AUTO: 64.4 % — SIGNIFICANT CHANGE UP (ref 43–77)
NITRITE UR-MCNC: NEGATIVE — SIGNIFICANT CHANGE UP
NRBC # BLD: 0 /100 WBCS — SIGNIFICANT CHANGE UP (ref 0–0)
PH UR: 5 — SIGNIFICANT CHANGE UP (ref 5–8)
PLATELET # BLD AUTO: 160 K/UL — SIGNIFICANT CHANGE UP (ref 150–400)
POTASSIUM SERPL-MCNC: 3.5 MMOL/L — SIGNIFICANT CHANGE UP (ref 3.5–5.3)
POTASSIUM SERPL-SCNC: 3.5 MMOL/L — SIGNIFICANT CHANGE UP (ref 3.5–5.3)
PROT SERPL-MCNC: 7 G/DL — SIGNIFICANT CHANGE UP (ref 6–8.3)
PROT UR-MCNC: 15
RBC # BLD: 4.31 M/UL — SIGNIFICANT CHANGE UP (ref 3.8–5.2)
RBC # FLD: 12.8 % — SIGNIFICANT CHANGE UP (ref 10.3–14.5)
SARS-COV-2 RNA SPEC QL NAA+PROBE: SIGNIFICANT CHANGE UP
SODIUM SERPL-SCNC: 140 MMOL/L — SIGNIFICANT CHANGE UP (ref 135–145)
SP GR SPEC: 1.02 — SIGNIFICANT CHANGE UP (ref 1.01–1.02)
T3 SERPL-MCNC: 87 NG/DL — SIGNIFICANT CHANGE UP (ref 80–200)
T4 AB SER-ACNC: 8.4 UG/DL — SIGNIFICANT CHANGE UP (ref 4.6–12)
TSH SERPL-MCNC: 1.85 UIU/ML — SIGNIFICANT CHANGE UP (ref 0.27–4.2)
UROBILINOGEN FLD QL: NEGATIVE — SIGNIFICANT CHANGE UP
WBC # BLD: 8.34 K/UL — SIGNIFICANT CHANGE UP (ref 3.8–10.5)
WBC # FLD AUTO: 8.34 K/UL — SIGNIFICANT CHANGE UP (ref 3.8–10.5)

## 2020-12-22 PROCEDURE — 99223 1ST HOSP IP/OBS HIGH 75: CPT

## 2020-12-22 PROCEDURE — 93010 ELECTROCARDIOGRAM REPORT: CPT

## 2020-12-22 RX ORDER — SACCHAROMYCES BOULARDII 250 MG
250 POWDER IN PACKET (EA) ORAL
Refills: 0 | Status: COMPLETED | OUTPATIENT
Start: 2020-12-22 | End: 2020-12-24

## 2020-12-22 RX ORDER — INSULIN GLARGINE 100 [IU]/ML
20 INJECTION, SOLUTION SUBCUTANEOUS AT BEDTIME
Refills: 0 | Status: DISCONTINUED | OUTPATIENT
Start: 2020-12-22 | End: 2020-12-23

## 2020-12-22 RX ORDER — CIPROFLOXACIN LACTATE 400MG/40ML
500 VIAL (ML) INTRAVENOUS EVERY 12 HOURS
Refills: 0 | Status: COMPLETED | OUTPATIENT
Start: 2020-12-22 | End: 2020-12-24

## 2020-12-22 RX ORDER — INSULIN LISPRO 100/ML
6 VIAL (ML) SUBCUTANEOUS
Refills: 0 | Status: DISCONTINUED | OUTPATIENT
Start: 2020-12-22 | End: 2020-12-22

## 2020-12-22 RX ORDER — INSULIN LISPRO 100/ML
6 VIAL (ML) SUBCUTANEOUS ONCE
Refills: 0 | Status: DISCONTINUED | OUTPATIENT
Start: 2020-12-22 | End: 2020-12-22

## 2020-12-22 RX ORDER — INSULIN LISPRO 100/ML
7 VIAL (ML) SUBCUTANEOUS
Refills: 0 | Status: DISCONTINUED | OUTPATIENT
Start: 2020-12-22 | End: 2020-12-24

## 2020-12-22 RX ADMIN — Medication 5: at 12:16

## 2020-12-22 RX ADMIN — Medication 250 MILLIGRAM(S): at 21:32

## 2020-12-22 RX ADMIN — Medication 3: at 07:51

## 2020-12-22 RX ADMIN — Medication 1 TABLET(S): at 12:16

## 2020-12-22 RX ADMIN — Medication 75 MICROGRAM(S): at 06:04

## 2020-12-22 RX ADMIN — TICAGRELOR 90 MILLIGRAM(S): 90 TABLET ORAL at 06:39

## 2020-12-22 RX ADMIN — Medication 7 UNIT(S): at 16:40

## 2020-12-22 RX ADMIN — Medication 3: at 16:41

## 2020-12-22 RX ADMIN — INSULIN GLARGINE 20 UNIT(S): 100 INJECTION, SOLUTION SUBCUTANEOUS at 21:32

## 2020-12-22 RX ADMIN — Medication 4 UNIT(S): at 07:51

## 2020-12-22 RX ADMIN — Medication 81 MILLIGRAM(S): at 12:16

## 2020-12-22 RX ADMIN — Medication 500 MILLIGRAM(S): at 17:34

## 2020-12-22 RX ADMIN — AMLODIPINE BESYLATE 5 MILLIGRAM(S): 2.5 TABLET ORAL at 06:04

## 2020-12-22 RX ADMIN — PANTOPRAZOLE SODIUM 40 MILLIGRAM(S): 20 TABLET, DELAYED RELEASE ORAL at 06:04

## 2020-12-22 RX ADMIN — Medication 500 MILLIGRAM(S): at 06:04

## 2020-12-22 RX ADMIN — ATORVASTATIN CALCIUM 80 MILLIGRAM(S): 80 TABLET, FILM COATED ORAL at 21:32

## 2020-12-22 RX ADMIN — SENNA PLUS 2 TABLET(S): 8.6 TABLET ORAL at 21:32

## 2020-12-22 RX ADMIN — LISINOPRIL 40 MILLIGRAM(S): 2.5 TABLET ORAL at 06:04

## 2020-12-22 RX ADMIN — TICAGRELOR 90 MILLIGRAM(S): 90 TABLET ORAL at 17:34

## 2020-12-22 RX ADMIN — ENOXAPARIN SODIUM 40 MILLIGRAM(S): 100 INJECTION SUBCUTANEOUS at 12:16

## 2020-12-22 RX ADMIN — Medication 6 UNIT(S): at 12:16

## 2020-12-22 NOTE — CHART NOTE - NSCHARTNOTEFT_GEN_A_CORE
REHABILITATION DIAGNOSIS:  Cerebral infarction        COMORBIDITIES/COMPLICATING CONDITIONS IMPACTING REHABILITATION:  HEALTH ISSUES - PROBLEM Dx:    left hemiparesis  right hemiparesis  dysarthria  dysphagia  cognitive impairment  adjustment disorder  uncontrolled DM  UTI    PAST MEDICAL & SURGICAL HISTORY:      Based upon consideration of the patient's impairments, functional status, complicating conditions and any other contributing factors and after information garnered from the assessments of all therapy disciplines involved in treating the patient and other pertinent clinicians:    INTERDISCIPLINARY REHABILITATION INTERVENTIONS:    [  x ] Transfer Training  [ x  ] Bed Mobility  [ x  ] Therapeutic Exercise  [  x ] Balance/Coordination Exercises  [ x  ] Locomotion training  [ x  ] Stairs    [ x  ] Functional transfers  [ x  ] Activities of daily living  [ x  ] Visual Perceptual training    [x   ] Bowel/Bladder program  [ x  ] Pain Management  [   ] Skin/Wound Care    [ x  ] Speech/Communication Exercise  [ x  ] Swallowing Exercises    [x   ] Cognitive Exercises  [x   ] Cognitive-linguistic Treatment  [   ] Behavioral Program    [ x  ] Patient/Family Counseling  [ x  ] Family Training  [   ] Community Re-entry  [x   ] Orthotic Evaluation/Training  [   ] Prosthetic Eval/Training    MEDICAL PROGNOSIS:  fair    REHAB POTENTIAL:  good-  EXPECTED DAILY THERAPIES:    [  x ] PT  [ x  ] OT  [  x ] ST    EXPECTED INTENSITY OF PROGRAM:  3 hours daily    EXPECTED FREQUENCY OF PROGRAM:  5 x week    ESTIMATED LOS:  14-17 days    ESTIMATED DISPOSITION:  home with caregiver support and home PT, OT, SLP    INTERDISCIPLINARY FUNCTIONAL OUTCOMES/GOALS:         Gait/Mobility: supervision/CG       Transfers: CG       ADLs: supervision/set up       Functional Transfers: CG/min assist       Medication Management: supervision       Communication: supervision       Cognitive: supervision       Nutrition: tolerate thin liquids       Bladder: continent       Bowel: continent

## 2020-12-22 NOTE — DIETITIAN NUTRITION RISK NOTIFICATION - TREATMENT: THE FOLLOWING DIET HAS BEEN RECOMMENDED
Recommend Initiate Glucerna 8oz PO BID (Provides 440kcal-20grams of Protein)  & Education Provided on Need for Supplementation

## 2020-12-22 NOTE — DIETITIAN INITIAL EVALUATION ADULT. - DIET TYPE
Education Provided on Need for Supplementation/low sodium/dysphagia 3, soft, nectar consistency fluid/consistent carbohydrate (evening snack)/supplement (specify)

## 2020-12-22 NOTE — CONSULT NOTE ADULT - ASSESSMENT
66 yo F with pmhx as above found with acute L pontine CVA admitted to acute rehab.      L pontine stroke  ASA and Brilinta  lipitor 80mg  PT/OT/SLP as per rehab  fall precaution    HTN  BP at goal  amlodipine  linsinopril    UTI  cipro    T2DM  f/u Hba1c  lantus and lispro, ISS  will adjust regimen    HLD  statin    hypothyroid  synthyroid    DVT ppx     64 yo F with pmhx as above found with acute L pontine CVA admitted to acute rehab.      L pontine stroke  ASA and Brilinta  lipitor 80mg  PT/OT/SLP as per rehab  fall precaution    HTN  BP at goal  amlodipine 5mg  linsinopril 40mg    UTI   diagnosed at [facility]  unclear when pt started abx and no end date.   cipro x 3d and then stop    T2DM with hyperglycemia  f/u Hba1c  lantus and lispro, ISS  regimen adjusted    HLD  statin    hypothyroid  synthyroid    DVT ppx

## 2020-12-22 NOTE — CONSULT NOTE ADULT - REASON FOR ADMISSION
01.2 Bilateral Involvement  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

## 2020-12-22 NOTE — DIETITIAN INITIAL EVALUATION ADULT. - ADD RECOMMEND
1) Monitor Weights, Intake, Tolerance, Skin, POCT & Labwork   2) Recommend Change Diet to Consistent Carbohydrate, Low Sodium Diet 3) Glucerna 8oz PO BID 4) Education Provided on Need for Supplementation 5) Continue Nutrition Plan of Care

## 2020-12-22 NOTE — CONSULT NOTE ADULT - SUBJECTIVE AND OBJECTIVE BOX
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 (21 Dec 2020 14:36)      HPI:  HPI:  Patient is a 65 year old female with PMH including HTN, HLD, +LOOP, asthma, hypothyroidism, DM, CVA x 3 (last in 2018) with residual left sided weakness and mild dysarthria, who presented to [facility] on 12/14/20 with worsening left sided weakness and slurring. Son also reported that the patient fell 3 times on the day of admission; patient herself denied pain. Imaging revealed acute L pontine infarct. Motor strength 3/5 LUE, 4/5 LLE, 5/5 . Intact sensation. Cleared by SLP for soft solid, nectar-thick liquid diet. Patient is on ASA, tigraglecor and atorvastatin.    Hospital Course complicated by UTI on course of PO Cipro 500 q12h. ENT was consulted for noted asymmetry in the nasopharynx on CT; no pathology noted on fiberoptic laryngoscopy. Bilateral TVC movement was noted, with no upper airway obstruction. No ENT surgical intervention recommended. Patient was stabilized and discharged to [facility] [place] for Acute inpatient rehab on 12/21/20. (21 Dec 2020 14:36)      PAST MEDICAL & SURGICAL HISTORY:  as above      Father: - at age - with history of   Mother: - at age - with history of           Substance Use (street drugs): (  ) never used  (  ) other:  Tobacco Usage:  (   ) never smoked   (   ) former smoker   (   ) current smoker  (     ) pack year  Alcohol Usage:  Sexual History:   Recent Travel:      Allergies    No Known Allergies    Intolerances            REVIEW OF SYSTEMS:  CONSTITUTIONAL: No fever, weight loss, or fatigue  EYES: No eye pain, visual disturbances, or discharge  ENMT:  No difficulty hearing, tinnitus, vertigo; No sinus or throat pain  NECK: No pain or stiffness  BREASTS: No pain, masses, or nipple discharge  RESPIRATORY: No cough, wheezing, chills or hemoptysis; No shortness of breath  CARDIOVASCULAR: No chest pain, palpitations, dizziness, or leg swelling  GASTROINTESTINAL: No abdominal or epigastric pain. No nausea, vomiting, or hematemesis; No diarrhea or constipation. No melena or hematochezia.  GENITOURINARY: No dysuria, frequency, hematuria, or incontinence  NEUROLOGICAL: No headaches, memory loss, loss of strength, numbness, or tremors  SKIN: No itching, burning, rashes, or lesions   LYMPH NODES: No enlarged glands  ENDOCRINE: No heat or cold intolerance; No hair loss  MUSCULOSKELETAL: No joint pain or swelling; No muscle, back, or extremity pain  PSYCHIATRIC: No depression, anxiety, mood swings, or difficulty sleeping  HEME/LYMPH: No easy bruising, or bleeding gums  ALLERY AND IMMUNOLOGIC: No hives or eczema    ALL ROS REVIEWED AND NORMAL EXCEPT AS STATED ABOVE    T(C): 36.9 (12-21-20 @ 21:54), Max: 36.9 (12-21-20 @ 21:54)  HR: 62 (12-22-20 @ 06:02) (62 - 76)  BP: 127/74 (12-22-20 @ 06:02) (119/69 - 127/74)  RR: 16 (12-21-20 @ 21:54) (16 - 16)  SpO2: 96% (12-21-20 @ 21:54) (96% - 96%)  Wt(kg): --Vital Signs Last 24 Hrs  T(C): 36.9 (21 Dec 2020 21:54), Max: 36.9 (21 Dec 2020 21:54)  T(F): 98.4 (21 Dec 2020 21:54), Max: 98.4 (21 Dec 2020 21:54)  HR: 62 (22 Dec 2020 06:02) (62 - 76)  BP: 127/74 (22 Dec 2020 06:02) (119/69 - 127/74)  BP(mean): --  RR: 16 (21 Dec 2020 21:54) (16 - 16)  SpO2: 96% (21 Dec 2020 21:54) (96% - 96%)    PHYSICAL EXAM:  GENERAL: NAD, well-groomed, well-developed  HEAD:  Atraumatic, Normocephalic  EYES: EOMI, PERRLA, conjunctiva and sclera clear  ENMT: No tonsillar erythema, exudates, or enlargement; Moist mucous membranes, Good dentition, No lesions  NECK: Supple, No JVD, Normal thyroid  NERVOUS SYSTEM:  Alert & Oriented X3, Good concentration; Motor Strength 5/5 B/L upper and lower extremities; DTRs 2+ intact and symmetric  CHEST/LUNG: Clear to percussion bilaterally; No rales, rhonchi, wheezing, or rubs  HEART: Regular rate and rhythm; No murmurs, rubs, or gallops  ABDOMEN: Soft, Nontender, Nondistended; Bowel sounds present  EXTREMITIES:  2+ Peripheral Pulses, No clubbing, cyanosis, or edema  LYMPH: No lymphadenopathy noted  SKIN: No rashes or lesions    MEDICATIONS  (STANDING):  amLODIPine   Tablet 5 milliGRAM(s) Oral daily  aspirin enteric coated 81 milliGRAM(s) Oral daily  atorvastatin 80 milliGRAM(s) Oral at bedtime  ciprofloxacin     Tablet 500 milliGRAM(s) Oral every 12 hours  dextrose 40% Gel 15 Gram(s) Oral once  dextrose 5%. 1000 milliLiter(s) (50 mL/Hr) IV Continuous <Continuous>  dextrose 5%. 1000 milliLiter(s) (100 mL/Hr) IV Continuous <Continuous>  dextrose 50% Injectable 25 Gram(s) IV Push once  dextrose 50% Injectable 12.5 Gram(s) IV Push once  dextrose 50% Injectable 25 Gram(s) IV Push once  enoxaparin Injectable 40 milliGRAM(s) SubCutaneous daily  glucagon  Injectable 1 milliGRAM(s) IntraMuscular once  insulin glargine Injectable (LANTUS) 12 Unit(s) SubCutaneous at bedtime  insulin lispro (ADMELOG) corrective regimen sliding scale   SubCutaneous three times a day before meals  insulin lispro Injectable (ADMELOG) 4 Unit(s) SubCutaneous three times a day before meals  levothyroxine 75 MICROGram(s) Oral daily  lisinopril 40 milliGRAM(s) Oral daily  multivitamin 1 Tablet(s) Oral daily  pantoprazole    Tablet 40 milliGRAM(s) Oral before breakfast  senna 2 Tablet(s) Oral at bedtime  ticagrelor 90 milliGRAM(s) Oral two times a day    MEDICATIONS  (PRN):  acetaminophen   Tablet .. 650 milliGRAM(s) Oral every 6 hours PRN Mild Pain (1 - 3), Moderate Pain (4 - 6)  ALBUTerol    90 MICROgram(s) HFA Inhaler 1 Puff(s) Inhalation every 4 hours PRN Shortness of Breath and/or Wheezing  polyethylene glycol 3350 17 Gram(s) Oral daily PRN Constipation      LABS:               CAPILLARY BLOOD GLUCOSE        POCT Blood Glucose.: 352 mg/dL (21 Dec 2020 23:18)            RADIOLOGY & ADDITIONAL TESTS:      Consultant(s) Notes Reviewed:  [x ] YES  [ ] NO  Care Discussed with Consultants/Other Providers [ x] YES  [ ] NO  Imaging Personally Reviewed:  [] YES  [ ] NO Patient is a 65y old  Female who presents with a chief complaint of 01.2 Bilateral Involvement  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 (21 Dec 2020 14:36)      HPI:  HPI:  Patient is a 65 year old female with PMH including HTN, HLD, +LOOP, asthma, hypothyroidism, DM, CVA x 3 (last in 2018) with residual left sided weakness and mild dysarthria, who presented to [facility] on 20 with worsening left sided weakness and slurring. Son also reported that the patient fell 3 times on the day of admission; patient herself denied pain. Imaging revealed acute L pontine infarct. Motor strength 3/5 LUE, 4/5 LLE, 5/5 . Intact sensation. Cleared by SLP for soft solid, nectar-thick liquid diet. Patient is on ASA, tigraglecor and atorvastatin.    Hospital Course complicated by UTI on course of PO Cipro 500 q12h. ENT was consulted for noted asymmetry in the nasopharynx on CT; no pathology noted on fiberoptic laryngoscopy. Bilateral TVC movement was noted, with no upper airway obstruction. No ENT surgical intervention recommended. Patient was stabilized and discharged to [facility] for Acute inpatient rehab on 20. (21 Dec 2020 14:36)      subjective: pt reports feeling fine, no complaints. no fever, chills, sob, cp, abd pain, n/v/d, dysuria.     ALL ROS REVIEWED AND NORMAL EXCEPT AS STATED ABOVE    PAST MEDICAL & SURGICAL HISTORY:  as above    Family history: denies stroke, CAD, DM, HTN in first degree family    Social history: denies smoking, etoh, or illicit drug use    Allergies    No Known Allergies    Intolerances    T(C): 36.9 (20 @ 21:54), Max: 36.9 (20 @ 21:54)  HR: 62 (20 @ 06:02) (62 - 76)  BP: 127/74 (20 @ 06:02) (119/69 - 127/74)  RR: 16 (20 @ 21:54) (16 - 16)  SpO2: 96% (20 @ 21:54) (96% - 96%)  Wt(kg): --Vital Signs Last 24 Hrs  T(C): 36.9 (21 Dec 2020 21:54), Max: 36.9 (21 Dec 2020 21:54)  T(F): 98.4 (21 Dec 2020 21:54), Max: 98.4 (21 Dec 2020 21:54)  HR: 62 (22 Dec 2020 06:02) (62 - 76)  BP: 127/74 (22 Dec 2020 06:02) (119/69 - 127/74)  BP(mean): --  RR: 16 (21 Dec 2020 21:54) (16 - 16)  SpO2: 96% (21 Dec 2020 21:54) (96% - 96%)    PHYSICAL EXAM:  GENERAL: NAD, answering questions appropriately  HEAD:  Atraumatic, Normocephalic  EYES: EOMI, PERRLA, conjunctiva and sclera clear  ENMT: No tonsillar erythema, exudates, or enlargement; Moist mucous membranes  NECK: Supple, No JVD  NERVOUS SYSTEM:  Alert & Oriented X3, mild dysarthria, muscle strength: proximal LUE 3/5, distal LUE 4/5, LLE 4/5, right side 5/5.   CHEST/LUNG: Clear to percussion bilaterally; No rales, rhonchi, wheezing, or rubs  HEART: Regular rate and rhythm; No murmurs, rubs, or gallops  ABDOMEN: Soft, Nontender, Nondistended; Bowel sounds present  EXTREMITIES:  No clubbing, cyanosis, or edema  SKIN: No rashes     MEDICATIONS  (STANDING):  amLODIPine   Tablet 5 milliGRAM(s) Oral daily  aspirin enteric coated 81 milliGRAM(s) Oral daily  atorvastatin 80 milliGRAM(s) Oral at bedtime  ciprofloxacin     Tablet 500 milliGRAM(s) Oral every 12 hours  dextrose 40% Gel 15 Gram(s) Oral once  dextrose 5%. 1000 milliLiter(s) (50 mL/Hr) IV Continuous <Continuous>  dextrose 5%. 1000 milliLiter(s) (100 mL/Hr) IV Continuous <Continuous>  dextrose 50% Injectable 25 Gram(s) IV Push once  dextrose 50% Injectable 12.5 Gram(s) IV Push once  dextrose 50% Injectable 25 Gram(s) IV Push once  enoxaparin Injectable 40 milliGRAM(s) SubCutaneous daily  glucagon  Injectable 1 milliGRAM(s) IntraMuscular once  insulin glargine Injectable (LANTUS) 12 Unit(s) SubCutaneous at bedtime  insulin lispro (ADMELOG) corrective regimen sliding scale   SubCutaneous three times a day before meals  insulin lispro Injectable (ADMELOG) 4 Unit(s) SubCutaneous three times a day before meals  levothyroxine 75 MICROGram(s) Oral daily  lisinopril 40 milliGRAM(s) Oral daily  multivitamin 1 Tablet(s) Oral daily  pantoprazole    Tablet 40 milliGRAM(s) Oral before breakfast  senna 2 Tablet(s) Oral at bedtime  ticagrelor 90 milliGRAM(s) Oral two times a day    MEDICATIONS  (PRN):  acetaminophen   Tablet .. 650 milliGRAM(s) Oral every 6 hours PRN Mild Pain (1 - 3), Moderate Pain (4 - 6)  ALBUTerol    90 MICROgram(s) HFA Inhaler 1 Puff(s) Inhalation every 4 hours PRN Shortness of Breath and/or Wheezing  polyethylene glycol 3350 17 Gram(s) Oral daily PRN Constipation      LABS:                        12.5   8.34  )-----------( 160      ( 22 Dec 2020 05:00 )             37.1         140  |  104  |  23  ----------------------------<  260<H>  3.5   |  29  |  0.70    Ca    9.2      22 Dec 2020 05:00    TPro  7.0  /  Alb  3.5  /  TBili  0.5  /  DBili  x   /  AST  29  /  ALT  60<H>  /  AlkPhos  78        Urinalysis Basic - ( 22 Dec 2020 12:05 )    Color: Yellow / Appearance: very cloudy / S.025 / pH: x  Gluc: x / Ketone: Trace  / Bili: Negative / Urobili: Negative   Blood: x / Protein: 15 / Nitrite: Negative   Leuk Esterase: Negative / RBC: >50 /HPF / WBC 0-2 /HPF   Sq Epi: x / Non Sq Epi: Neg.-Few / Bacteria: Negative /HPF       CAPILLARY BLOOD GLUCOSE      POCT Blood Glucose.: 351 mg/dL (22 Dec 2020 11:58)  POCT Blood Glucose.: 273 mg/dL (22 Dec 2020 07:49)  POCT Blood Glucose.: 352 mg/dL (21 Dec 2020 23:18)        Urinalysis Basic - ( 22 Dec 2020 12:05 )    Color: Yellow / Appearance: very cloudy / S.025 / pH: x  Gluc: x / Ketone: Trace  / Bili: Negative / Urobili: Negative   Blood: x / Protein: 15 / Nitrite: Negative   Leuk Esterase: Negative / RBC: >50 /HPF / WBC 0-2 /HPF   Sq Epi: x / Non Sq Epi: Neg.-Few / Bacteria: Negative /HPF        RADIOLOGY & ADDITIONAL TESTS:    Consultant(s) Notes Reviewed:  [x ] YES  [ ] NO  Care Discussed with Consultants/Other Providers [ x] YES  [ ] NO  Imaging Personally Reviewed:  [x ] YES  [ ] NO      RADIOLOGY & ADDITIONAL TESTS:    MRI Brain 20  Definite small acute infarct in the anterior left kirstin. No acute intracranial hemorrhage.  Chronic ischemic changes as above. Moderate parenchymal volume loss, progressed since .     CT Head 20  1. No evidence of acute territorial infarct or intracranial hemorrhage.  Vascular calcifications of bilateral internal carotid and vertebral arteries.        CTA Head/Neck 20  Atretic and irregular left proximal PCA, unchanged from , otherwise no large vessel occlusion or high-grade stenosis. Mild to moderate vertebrobasilar narrowing. Mild short segment narrowing of the proximal left cervical ICA. Soft tissue fullness in the left nasopharynx. Recommend direct inspection of this region to exclude neoplasm.      Consultant(s) Notes Reviewed:  [x ] YES  [ ] NO  Care Discussed with Consultants/Other Providers [ x] YES  [ ] NO  Imaging Personally Reviewed:  [x] YES  [ ] NO Patient is a 65y old  Female who presents with a chief complaint of 01.2 Bilateral Involvement  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 (21 Dec 2020 14:36)      HPI:  HPI:  Patient is a 65 year old female with PMH including HTN, HLD, +LOOP, asthma, hypothyroidism, DM, CVA x 3 (last in 2018) with residual left sided weakness and mild dysarthria, who presented to [facility] on 20 with worsening left sided weakness and slurring. Son also reported that the patient fell 3 times on the day of admission; patient herself denied pain. Imaging revealed acute L pontine infarct. Motor strength 3/5 LUE, 4/5 LLE, 5/5 . Intact sensation. Cleared by SLP for soft solid, nectar-thick liquid diet. Patient is on ASA, tigraglecor and atorvastatin.    Hospital Course complicated by UTI on course of PO Cipro 500 q12h. ENT was consulted for noted asymmetry in the nasopharynx on CT; no pathology noted on fiberoptic laryngoscopy. Bilateral TVC movement was noted, with no upper airway obstruction. No ENT surgical intervention recommended. Patient was stabilized and discharged to [facility] for Acute inpatient rehab on 20. (21 Dec 2020 14:36)      subjective: pt reports feeling fine, no complaints. no fever, chills, sob, cp, abd pain, n/v/d, dysuria.     ALL ROS REVIEWED AND NORMAL EXCEPT AS STATED ABOVE    PAST MEDICAL & SURGICAL HISTORY:  as above    Family history: denies stroke, CAD, DM, HTN in first degree family    Social history: denies smoking, etoh, or illicit drug use    Allergies    No Known Allergies    Intolerances    T(C): 36.9 (20 @ 21:54), Max: 36.9 (20 @ 21:54)  HR: 62 (20 @ 06:02) (62 - 76)  BP: 127/74 (20 @ 06:02) (119/69 - 127/74)  RR: 16 (20 @ 21:54) (16 - 16)  SpO2: 96% (20 @ 21:54) (96% - 96%)  Wt(kg): --Vital Signs Last 24 Hrs  T(C): 36.9 (21 Dec 2020 21:54), Max: 36.9 (21 Dec 2020 21:54)  T(F): 98.4 (21 Dec 2020 21:54), Max: 98.4 (21 Dec 2020 21:54)  HR: 62 (22 Dec 2020 06:02) (62 - 76)  BP: 127/74 (22 Dec 2020 06:02) (119/69 - 127/74)  BP(mean): --  RR: 16 (21 Dec 2020 21:54) (16 - 16)  SpO2: 96% (21 Dec 2020 21:54) (96% - 96%)    PHYSICAL EXAM:  GENERAL: NAD, answering questions appropriately  HEAD:  Atraumatic, Normocephalic  EYES: EOMI, PERRLA, conjunctiva and sclera clear  ENMT: No tonsillar erythema, exudates, or enlargement; Moist mucous membranes  NECK: Supple, No JVD  NERVOUS SYSTEM:  Alert & Oriented X3, mild dysarthria, muscle strength: proximal LUE 3/5, distal LUE 4/5, LLE 4/5, right side 5/5.   CHEST/LUNG: Clear to percussion bilaterally; No rales, rhonchi, wheezing, or rubs  HEART: Regular rate and rhythm; No murmurs, rubs, or gallops  ABDOMEN: Soft, Nontender, Nondistended; Bowel sounds present  EXTREMITIES:  No clubbing, cyanosis, or edema  SKIN: No rashes     MEDICATIONS  (STANDING):  amLODIPine   Tablet 5 milliGRAM(s) Oral daily  aspirin enteric coated 81 milliGRAM(s) Oral daily  atorvastatin 80 milliGRAM(s) Oral at bedtime  ciprofloxacin     Tablet 500 milliGRAM(s) Oral every 12 hours  dextrose 40% Gel 15 Gram(s) Oral once  dextrose 5%. 1000 milliLiter(s) (50 mL/Hr) IV Continuous <Continuous>  dextrose 5%. 1000 milliLiter(s) (100 mL/Hr) IV Continuous <Continuous>  dextrose 50% Injectable 25 Gram(s) IV Push once  dextrose 50% Injectable 12.5 Gram(s) IV Push once  dextrose 50% Injectable 25 Gram(s) IV Push once  enoxaparin Injectable 40 milliGRAM(s) SubCutaneous daily  glucagon  Injectable 1 milliGRAM(s) IntraMuscular once  insulin glargine Injectable (LANTUS) 12 Unit(s) SubCutaneous at bedtime  insulin lispro (ADMELOG) corrective regimen sliding scale   SubCutaneous three times a day before meals  insulin lispro Injectable (ADMELOG) 4 Unit(s) SubCutaneous three times a day before meals  levothyroxine 75 MICROGram(s) Oral daily  lisinopril 40 milliGRAM(s) Oral daily  multivitamin 1 Tablet(s) Oral daily  pantoprazole    Tablet 40 milliGRAM(s) Oral before breakfast  senna 2 Tablet(s) Oral at bedtime  ticagrelor 90 milliGRAM(s) Oral two times a day    MEDICATIONS  (PRN):  acetaminophen   Tablet .. 650 milliGRAM(s) Oral every 6 hours PRN Mild Pain (1 - 3), Moderate Pain (4 - 6)  ALBUTerol    90 MICROgram(s) HFA Inhaler 1 Puff(s) Inhalation every 4 hours PRN Shortness of Breath and/or Wheezing  polyethylene glycol 3350 17 Gram(s) Oral daily PRN Constipation      LABS:                        12.5   8.34  )-----------( 160      ( 22 Dec 2020 05:00 )             37.1         140  |  104  |  23  ----------------------------<  260<H>  3.5   |  29  |  0.70    Ca    9.2      22 Dec 2020 05:00    TPro  7.0  /  Alb  3.5  /  TBili  0.5  /  DBili  x   /  AST  29  /  ALT  60<H>  /  AlkPhos  78        Urinalysis Basic - ( 22 Dec 2020 12:05 )    Color: Yellow / Appearance: very cloudy / S.025 / pH: x  Gluc: x / Ketone: Trace  / Bili: Negative / Urobili: Negative   Blood: x / Protein: 15 / Nitrite: Negative   Leuk Esterase: Negative / RBC: >50 /HPF / WBC 0-2 /HPF   Sq Epi: x / Non Sq Epi: Neg.-Few / Bacteria: Negative /HPF       CAPILLARY BLOOD GLUCOSE      POCT Blood Glucose.: 351 mg/dL (22 Dec 2020 11:58)  POCT Blood Glucose.: 273 mg/dL (22 Dec 2020 07:49)  POCT Blood Glucose.: 352 mg/dL (21 Dec 2020 23:18)        Urinalysis Basic - ( 22 Dec 2020 12:05 )    Color: Yellow / Appearance: very cloudy / S.025 / pH: x  Gluc: x / Ketone: Trace  / Bili: Negative / Urobili: Negative   Blood: x / Protein: 15 / Nitrite: Negative   Leuk Esterase: Negative / RBC: >50 /HPF / WBC 0-2 /HPF   Sq Epi: x / Non Sq Epi: Neg.-Few / Bacteria: Negative /HPF        RADIOLOGY & ADDITIONAL TESTS:    Consultant(s) Notes Reviewed:  [x ] YES  [ ] NO  Care Discussed with Consultants/Other Providers [ x] YES  [ ] NO  Imaging Personally Reviewed:  [ ] YES  [ ] NO      RADIOLOGY & ADDITIONAL TESTS:    MRI Brain 20  Definite small acute infarct in the anterior left kirstin. No acute intracranial hemorrhage.  Chronic ischemic changes as above. Moderate parenchymal volume loss, progressed since .     CT Head 20  1. No evidence of acute territorial infarct or intracranial hemorrhage.  Vascular calcifications of bilateral internal carotid and vertebral arteries.        CTA Head/Neck 20  Atretic and irregular left proximal PCA, unchanged from , otherwise no large vessel occlusion or high-grade stenosis. Mild to moderate vertebrobasilar narrowing. Mild short segment narrowing of the proximal left cervical ICA. Soft tissue fullness in the left nasopharynx. Recommend direct inspection of this region to exclude neoplasm.      Consultant(s) Notes Reviewed:  [x ] YES  [ ] NO  Care Discussed with Consultants/Other Providers [ x] YES  [ ] NO  Imaging Personally Reviewed:  [x] YES  [ ] NO

## 2020-12-22 NOTE — DIETITIAN NUTRITION RISK NOTIFICATION - PROVIDER ATTESTATION
By signing this assessment you are acknowledging and agree with the diagnosis/diagnoses assigned by the Registered Dietitian room air

## 2020-12-22 NOTE — DIETITIAN INITIAL EVALUATION ADULT. - PHYSCIAL ASSESSMENT
Temporalis, Orbital Fat Pads, Buccal Fat Pads, Clavicle, Bicep,, Quadricep & Gastrocnemius(Calf)  Wasting Observed  (Per Nutrition Focused Physical Exam)

## 2020-12-22 NOTE — DIETITIAN INITIAL EVALUATION ADULT. - PERTINENT MEDS FT
ASA, Lovenox, Cipro, Lantus, Admelog, Norvasc, Lipitor, Levothyroxine, Lisinopril, Protonix, Senna, Multivitamin

## 2020-12-22 NOTE — DIETITIAN INITIAL EVALUATION ADULT. - OTHER INFO
Initial Nutrition Assessment   65yr Old Female   Dx: CVA   Diet -  Consistent Carbohydrate DASH-TLC Soft (Dysphagia 3-Soft & Bite Sized) Diet w/ Nectar Thick Liquids   (Recommend Change Diet to Consistent Carbohydrate Low Sodium Diet & Recommend Initiate Glucerna 8oz PO BID)   Denies Food Allergy/Intolerance  Tolerates Diet Well - No Chewing/Some Signs Swallowing Complications (Per Patient) - Discussed w/ Speech Therapy - Diet/Fluids Remains Appropriate  No Pressure Ulcers (as Per Nursing Flow Sheets)  No Edema Noted (as Per Nursing Flow Sheets)  No Recent Nausea/Vomiting/Diarrhea/Constipation (as Per Patient)

## 2020-12-22 NOTE — DIETITIAN INITIAL EVALUATION ADULT. - ORAL INTAKE PTA/DIET HISTORY
on Consistent Carbohydrate DASH-TLC Soft (Dysphagia 3-Soft & Bite Sized) Diet w/ Nectar Thick Liquids   Recommend Change Diet to Consistent Carbohydrate Low Sodium Diet & Recommend Initiate Glucerna 8oz PO BID (Provides 440kcal-20grams of Protein)     Patient Does Follow "Low Sugar" Diet @Home, Consumes 3 Meals a Day w/ a Snack @Night & Does Take Vitamin/Supplements @Home (Vitamin C, Vitamin D, Fish Oil)

## 2020-12-23 LAB
CULTURE RESULTS: SIGNIFICANT CHANGE UP
SPECIMEN SOURCE: SIGNIFICANT CHANGE UP

## 2020-12-23 PROCEDURE — 99232 SBSQ HOSP IP/OBS MODERATE 35: CPT

## 2020-12-23 PROCEDURE — 71045 X-RAY EXAM CHEST 1 VIEW: CPT | Mod: 26

## 2020-12-23 PROCEDURE — 96116 NUBHVL XM PHYS/QHP 1ST HR: CPT

## 2020-12-23 RX ORDER — INSULIN GLARGINE 100 [IU]/ML
25 INJECTION, SOLUTION SUBCUTANEOUS AT BEDTIME
Refills: 0 | Status: DISCONTINUED | OUTPATIENT
Start: 2020-12-23 | End: 2020-12-24

## 2020-12-23 RX ORDER — INSULIN LISPRO 100/ML
VIAL (ML) SUBCUTANEOUS
Refills: 0 | Status: DISCONTINUED | OUTPATIENT
Start: 2020-12-23 | End: 2021-01-05

## 2020-12-23 RX ORDER — AMANTADINE HCL 100 MG
100 CAPSULE ORAL DAILY
Refills: 0 | Status: DISCONTINUED | OUTPATIENT
Start: 2020-12-23 | End: 2021-01-05

## 2020-12-23 RX ORDER — INSULIN LISPRO 100/ML
VIAL (ML) SUBCUTANEOUS AT BEDTIME
Refills: 0 | Status: DISCONTINUED | OUTPATIENT
Start: 2020-12-23 | End: 2021-01-05

## 2020-12-23 RX ADMIN — Medication 7 UNIT(S): at 12:12

## 2020-12-23 RX ADMIN — ENOXAPARIN SODIUM 40 MILLIGRAM(S): 100 INJECTION SUBCUTANEOUS at 12:38

## 2020-12-23 RX ADMIN — Medication 250 MILLIGRAM(S): at 06:20

## 2020-12-23 RX ADMIN — Medication 7 UNIT(S): at 16:52

## 2020-12-23 RX ADMIN — Medication 250 MILLIGRAM(S): at 17:48

## 2020-12-23 RX ADMIN — Medication 1: at 21:48

## 2020-12-23 RX ADMIN — Medication 1 TABLET(S): at 12:38

## 2020-12-23 RX ADMIN — Medication 7 UNIT(S): at 07:57

## 2020-12-23 RX ADMIN — Medication 81 MILLIGRAM(S): at 12:38

## 2020-12-23 RX ADMIN — INSULIN GLARGINE 25 UNIT(S): 100 INJECTION, SOLUTION SUBCUTANEOUS at 21:12

## 2020-12-23 RX ADMIN — SENNA PLUS 2 TABLET(S): 8.6 TABLET ORAL at 21:11

## 2020-12-23 RX ADMIN — Medication 8: at 16:54

## 2020-12-23 RX ADMIN — Medication 500 MILLIGRAM(S): at 06:20

## 2020-12-23 RX ADMIN — Medication 75 MICROGRAM(S): at 06:20

## 2020-12-23 RX ADMIN — LISINOPRIL 40 MILLIGRAM(S): 2.5 TABLET ORAL at 06:20

## 2020-12-23 RX ADMIN — Medication 3: at 07:58

## 2020-12-23 RX ADMIN — Medication 8: at 12:12

## 2020-12-23 RX ADMIN — TICAGRELOR 90 MILLIGRAM(S): 90 TABLET ORAL at 06:20

## 2020-12-23 RX ADMIN — AMLODIPINE BESYLATE 5 MILLIGRAM(S): 2.5 TABLET ORAL at 06:20

## 2020-12-23 RX ADMIN — TICAGRELOR 90 MILLIGRAM(S): 90 TABLET ORAL at 21:11

## 2020-12-23 RX ADMIN — Medication 500 MILLIGRAM(S): at 17:48

## 2020-12-23 RX ADMIN — PANTOPRAZOLE SODIUM 40 MILLIGRAM(S): 20 TABLET, DELAYED RELEASE ORAL at 06:20

## 2020-12-23 RX ADMIN — ATORVASTATIN CALCIUM 80 MILLIGRAM(S): 80 TABLET, FILM COATED ORAL at 21:11

## 2020-12-23 NOTE — CONSULT NOTE ADULT - SUBJECTIVE AND OBJECTIVE BOX
Pt is a 65 year-old right female with PMHx including HTN, HLD, +LOOP, asthma, hypothyroidism, DM, CVA x 3 (last in 2018) with residual left sided weakness and mild dysarthria, who presented to [facility]/[place] on 12/14/20 with worsening left sided weakness and slurring. Son also reported that the patient fell 3 times on the day of admission; Pt herself denied pain. Imaging revealed acute L pontine infarct. Motor strength 3/5 LUE, 4/5 LLE, 5/5 . Intact sensation. Cleared by SLP for soft solid, nectar-thick liquid diet. Patient is on ASA, tigraglecor and atorvastatin. Hospital Course complicated by UTI on course of PO Cipro 500 q12h. ENT was consulted for noted asymmetry in the nasopharynx on CT; no pathology noted on fiberoptic laryngoscopy. Bilateral TVC movement was noted, with no upper airway obstruction. No ENT surgical intervention recommended. Patient was stabilized and discharged to [facility] for Acute inpatient rehab on 12/21/20. PMHx: As noted above. Current meds: Please see list in Pt’s chart. Social Hx: Pt is  and has two adult children. Pt graduated from high school and is a retired . She lacks hx of mental illness and substance abuse. Pt is not Sikh. She lacks leisure activities.  Findings: Pt was seen for an initial assessment of her cognitive and emotional functioning. MMSE was administered; Pt’s results (20/30) were in the Mild to Moderate Impairment range. Her scores in a geriatric mood measure suggested mild to moderate levels of  depression (GDS = 6/15). Pt was alert, closely Ox3 (disoriented to hospital floor, city and county), and cooperative. Attn/Conc: Simple auditory attention - intact.  Concentration - impaired. Working memory for calculations – moderately impaired (1/5 serial 7s); unable to spell a 5-letter word backwards. Language: Pt’s speech was hypophonic, with normal pitch and a slightly response delay. Naming - intact. Sentence repetition - intact. Auditory Comprehension - intact. Reading - intact. Writing - intact. Memory: Encoding of 3 words was intact (3/3); short-delayed verbal recall – moderately impaired (1/3, improving to 3/3 with cueing); long-delayed verbal recall – mildly impaired (2/3, improving to 3/3 with cueing). LTM was mildly impaired for US presidents (3/4, improving to 4/4 with cueing). Visual memory – mildly impaired. Visuospatial: Visuomotor integration – impaired for copy of a 2D figure, mild sloppiness was noted. Executive Functions: Motor Planning - intact. Organizational skills - mildly impaired. Abstract reasoning - mildly impaired. Verbal problem solving – mildly impaired. Emotional functioning: Affect - constricted, anxious. Mood - euthymic ("okay"); Pt reported experiencing poor sleep, low energy and fatigue. On a mood measure she additionally reported decreased range of activities/interests, feelings of isolation, memory loss, low self esteem, low energy and hopelessness. Thought processes were goal-directed. No abnormal thought contents were observed.  Pt denied any AH/VH. Pt also denied SI/HI/I/P. Insight - WFL. Judgment - fair.

## 2020-12-23 NOTE — PROGRESS NOTE ADULT - ASSESSMENT
66 yo F with pmhx as above found with acute L pontine CVA admitted to acute rehab.      L pontine stroke  ASA and Brilinta  lipitor 80mg  PT/OT/SLP as per rehab  fall precaution    HTN  BP at goal  amlodipine 5mg  linsinopril 40mg    UTI   diagnosed at [facility]  unclear when pt started abx and no end date.   cipro x 3d and then stop    T2DM with hyperglycemia  Hba1c 7.5   lantus and lispro, ISS  regimen adjusted    HLD  statin    hypothyroid  synthyroid    DVT ppx

## 2020-12-23 NOTE — CONSULT NOTE ADULT - REASON FOR ADMISSION
Left Pontine CVA with left sided weakness of upper extremities and bilateral lower extremity weakness  Impairment code: 01.3 Bilateral Involvement

## 2020-12-23 NOTE — PROGRESS NOTE ADULT - SUBJECTIVE AND OBJECTIVE BOX
Patient is a 65y old  Female who presents with a chief complaint of Left Pontine CVA with left sided weakness of upper extremities and bilateral lower extremity weakness  Impairment code: 01.3 Bilateral Involvement (23 Dec 2020 13:10)      HPI:  Patient is a 65 year old female RH-dominant with PMH including HTN, HLD, +LOOP, asthma, hypothyroidism, DM, CVA x 3 (last in 2018) with residual left sided weakness and mild dysarthria, who presented to [facility] on 20 with worsening left sided weakness and slurring. Son also reported that the patient fell 3 times on the day of admission; patient herself denied pain. Imaging revealed acute L pontine infarct. Motor strength 3/5 LUE, 4/5 LLE, 5/5 . Intact sensation. Cleared by SLP for soft solid, nectar-thick liquid diet. Patient is on ASA, tigraglecor and atorvastatin.    Hospital Course complicated by UTI on course of PO Cipro 500 q12h. ENT was consulted for noted asymmetry in the nasopharynx on CT; no pathology noted on fiberoptic laryngoscopy. Bilateral TVC movement was noted, with no upper airway obstruction. No ENT surgical intervention recommended. Patient was stabilized and discharged to [facility] for Acute inpatient rehab on 20. (21 Dec 2020 14:36)      PAST MEDICAL & SURGICAL HISTORY:      MEDICATIONS  (STANDING):  amLODIPine   Tablet 5 milliGRAM(s) Oral daily  aspirin enteric coated 81 milliGRAM(s) Oral daily  atorvastatin 80 milliGRAM(s) Oral at bedtime  ciprofloxacin     Tablet 500 milliGRAM(s) Oral every 12 hours  dextrose 40% Gel 15 Gram(s) Oral once  dextrose 5%. 1000 milliLiter(s) (50 mL/Hr) IV Continuous <Continuous>  dextrose 5%. 1000 milliLiter(s) (100 mL/Hr) IV Continuous <Continuous>  dextrose 50% Injectable 25 Gram(s) IV Push once  dextrose 50% Injectable 12.5 Gram(s) IV Push once  dextrose 50% Injectable 25 Gram(s) IV Push once  enoxaparin Injectable 40 milliGRAM(s) SubCutaneous daily  glucagon  Injectable 1 milliGRAM(s) IntraMuscular once  insulin glargine Injectable (LANTUS) 25 Unit(s) SubCutaneous at bedtime  insulin lispro (ADMELOG) corrective regimen sliding scale   SubCutaneous three times a day before meals  insulin lispro Injectable (ADMELOG) 7 Unit(s) SubCutaneous three times a day before meals  levothyroxine 75 MICROGram(s) Oral daily  lisinopril 40 milliGRAM(s) Oral daily  multivitamin 1 Tablet(s) Oral daily  pantoprazole    Tablet 40 milliGRAM(s) Oral before breakfast  saccharomyces boulardii 250 milliGRAM(s) Oral two times a day  senna 2 Tablet(s) Oral at bedtime  ticagrelor 90 milliGRAM(s) Oral two times a day    MEDICATIONS  (PRN):  acetaminophen   Tablet .. 650 milliGRAM(s) Oral every 6 hours PRN Mild Pain (1 - 3), Moderate Pain (4 - 6)  ALBUTerol    90 MICROgram(s) HFA Inhaler 1 Puff(s) Inhalation every 4 hours PRN Shortness of Breath and/or Wheezing  polyethylene glycol 3350 17 Gram(s) Oral daily PRN Constipation      Allergies    No Known Allergies    Intolerances          VITALS  65y  Vital Signs Last 24 Hrs  T(C): 37.2 (23 Dec 2020 08:43), Max: 37.2 (23 Dec 2020 08:43)  T(F): 98.9 (23 Dec 2020 08:43), Max: 98.9 (23 Dec 2020 08:43)  HR: 95 (23 Dec 2020 08:43) (76 - 95)  BP: 135/77 (23 Dec 2020 08:43) (122/79 - 137/74)  BP(mean): --  RR: 16 (23 Dec 2020 08:43) (16 - 16)  SpO2: 97% (23 Dec 2020 08:43) (97% - 97%)  Daily     Daily         RECENT LABS:                          12.5   8.34  )-----------( 160      ( 22 Dec 2020 05:00 )             37.1         140  |  104  |  23  ----------------------------<  260<H>  3.5   |  29  |  0.70    Ca    9.2      22 Dec 2020 05:00    TPro  7.0  /  Alb  3.5  /  TBili  0.5  /  DBili  x   /  AST  29  /  ALT  60<H>  /  AlkPhos  78      LIVER FUNCTIONS - ( 22 Dec 2020 05:00 )  Alb: 3.5 g/dL / Pro: 7.0 g/dL / ALK PHOS: 78 U/L / ALT: 60 U/L / AST: 29 U/L / GGT: x             Urinalysis Basic - ( 22 Dec 2020 12:05 )    Color: Yellow / Appearance: very cloudy / S.025 / pH: x  Gluc: x / Ketone: Trace  / Bili: Negative / Urobili: Negative   Blood: x / Protein: 15 / Nitrite: Negative   Leuk Esterase: Negative / RBC: >50 /HPF / WBC 0-2 /HPF   Sq Epi: x / Non Sq Epi: Neg.-Few / Bacteria: Negative /HPF          CAPILLARY BLOOD GLUCOSE      POCT Blood Glucose.: 318 mg/dL (23 Dec 2020 16:50)  POCT Blood Glucose.: 321 mg/dL (23 Dec 2020 12:11)  POCT Blood Glucose.: 280 mg/dL (23 Dec 2020 07:56)  POCT Blood Glucose.: 244 mg/dL (22 Dec 2020 21:28)        EXAM:  XR CHEST PORTABLE ROUTINE 1V      PROCEDURE DATE:  2020        INTERPRETATION:  Portable chest x-ray    Indication: Cough    Comparison: 2012    Portable chest x-ray is acquired for evaluation.    Impression: No evidence for acute pulmonary infiltrate, pleural effusion, or pneumothorax.    Stable cardiac silhouette.    No pulmonary vascular congestion.    New left loop recorder.                YAZAN LARA MD; Attending Radiologist  This document has been electronically signed. Dec 23 2020  4:09PM

## 2020-12-23 NOTE — PROGRESS NOTE ADULT - ASSESSMENT
JEFE CONSTANTINO is a 65y F with a history of HTN, DM, HLD, +LOOP, asthma, hypothyroidism, CVA who presented to [facility] on 12/14/20 with acute Left Pontine Stroke.    #Acute Pontine Stroke with left upper and bilateral lower extremity weakness, dysarthria, dysphagia, and cognitive impairment  -continue Comprehensive Rehab Program of PT/OT/SLP  3 hours a day for 5 days a week  -neuropsychology evaluation cognition and mod  -continue secondary PPX with  ASA 81mg po daily, ticagrelor 90mg po q12h , Atorvastatin 80mg po qhs  - Dysphagia: cleared by ENT for acute structural pathologies. Currently on soft solids and nectar-thick liquids. Monitor closely due to cough, CXR 12/23 negative for infiltrate or effusion  -start amantadine 100 mg daily to improve attention and initiation  -Precautions: cardiac, DM, aspiration, fall, AMS    #HTN  - Outside permissive HTN stage  - Lisinopril 40mg po daily   - amolodipine 5mg po daily    #hypothyroidism   - TFT on admissions  - Levothyroxine 75mcg po daily  -hospitalist consult    #DM: uncontrolled  -order A1C ordered for 12/24  - lantus 12 unit sub Q-->increased to 20 units qhs 12/22-->25 u qhs 12/24  - lispro 4units premeals-->increased to 7 units qAC 12/22  - ISS coverage    #Asthma  - albuterol MDI 2 pulls inhalation q4h PRN  -stable    #Skin  - Pressure injury/Skin: OOB to Chair, PT/OT     #Pain Mgmt   - Tylenol PRN    #GI/Bowel Mgmt   -  Senna, Miralax PRN    #/Bladder Mgmt   - bladder scan, PVR per routiune  -s/p Rx UTI with cipro x 3 days.  -optimize hydration, address constipation, mobility    #COVID-19 Surveillance  - Negatives: 12/14, 12/18, 12/21    #FEN   - Diet - Soft Diet, nectar thick liquids  [CCHO, DASH/TLC]    -monitor hydration status on nectar-thick liquids    #DVT PPX:   -Lovenox  -SCD TEDs      #LABS  HgA1C 12/24  montior FS  CBC BMP 12/24

## 2020-12-23 NOTE — CONSULT NOTE ADULT - ASSESSMENT
Assessment: Pt presents with mild to possibly moderate cognitive deficits (mild vs. major neurocognitive disorder due to CVA). She exhibits deficits in concentration, working memory, short-term recall of auditory-verbal information, visuospatial skills, and aspects of executive functions (organizational skills, problem solving, abstract reasoning). Pt's affect is constricted and anxious, and she reports several symptoms of depression that were listed above, in response to her current medical situation. FIM scores: Social Interaction 4; Problem Solving 4; Memory 5.  Plan: Individual supportive psychotherapy to monitor cognition, affect/mood, and behavior.  Pt will benefit from antidepressant medication to help her improve her coping coping abilities. Cognitive remediation during speech therapy sessions is strongly recommended.. Participation in recreation/art therapy in order to have pleasure and mastery experiences and regain/reestablish a sense of routine.

## 2020-12-23 NOTE — PROGRESS NOTE ADULT - SUBJECTIVE AND OBJECTIVE BOX
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 (22 Dec 2020 07:23)      Patient seen and examined at bedside.    ALLERGIES:  No Known Allergies    MEDICATIONS  (STANDING):  amLODIPine   Tablet 5 milliGRAM(s) Oral daily  aspirin enteric coated 81 milliGRAM(s) Oral daily  atorvastatin 80 milliGRAM(s) Oral at bedtime  ciprofloxacin     Tablet 500 milliGRAM(s) Oral every 12 hours  dextrose 40% Gel 15 Gram(s) Oral once  dextrose 5%. 1000 milliLiter(s) (50 mL/Hr) IV Continuous <Continuous>  dextrose 5%. 1000 milliLiter(s) (100 mL/Hr) IV Continuous <Continuous>  dextrose 50% Injectable 25 Gram(s) IV Push once  dextrose 50% Injectable 12.5 Gram(s) IV Push once  dextrose 50% Injectable 25 Gram(s) IV Push once  enoxaparin Injectable 40 milliGRAM(s) SubCutaneous daily  glucagon  Injectable 1 milliGRAM(s) IntraMuscular once  insulin glargine Injectable (LANTUS) 20 Unit(s) SubCutaneous at bedtime  insulin lispro (ADMELOG) corrective regimen sliding scale   SubCutaneous three times a day before meals  insulin lispro Injectable (ADMELOG) 7 Unit(s) SubCutaneous three times a day before meals  levothyroxine 75 MICROGram(s) Oral daily  lisinopril 40 milliGRAM(s) Oral daily  multivitamin 1 Tablet(s) Oral daily  pantoprazole    Tablet 40 milliGRAM(s) Oral before breakfast  saccharomyces boulardii 250 milliGRAM(s) Oral two times a day  senna 2 Tablet(s) Oral at bedtime  ticagrelor 90 milliGRAM(s) Oral two times a day    MEDICATIONS  (PRN):  acetaminophen   Tablet .. 650 milliGRAM(s) Oral every 6 hours PRN Mild Pain (1 - 3), Moderate Pain (4 - 6)  ALBUTerol    90 MICROgram(s) HFA Inhaler 1 Puff(s) Inhalation every 4 hours PRN Shortness of Breath and/or Wheezing  polyethylene glycol 3350 17 Gram(s) Oral daily PRN Constipation    Vital Signs Last 24 Hrs  T(F): 98.9 (23 Dec 2020 08:43), Max: 98.9 (23 Dec 2020 08:43)  HR: 95 (23 Dec 2020 08:43) (76 - 95)  BP: 135/77 (23 Dec 2020 08:43) (122/79 - 137/74)  RR: 16 (23 Dec 2020 08:43) (16 - 16)  SpO2: 97% (23 Dec 2020 08:43) (97% - 97%)  I&O's Summary      PHYSICAL EXAM:  General: NAD, A/O x 3  ENT: MMM  Neck: Supple, No JVD  Lungs: Clear to auscultation bilaterally  Cardio: RRR, S1/S2, No murmurs  Abdomen: Soft, Nontender, Nondistended; Bowel sounds present  Extremities: No calf tenderness, No pitting edema    LABS:                        12.5   8.34  )-----------( 160      ( 22 Dec 2020 05:00 )             37.1           140  |  104  |  23  ----------------------------<  260  3.5   |  29  |  0.70    Ca    9.2      22 Dec 2020 05:00    TPro  7.0  /  Alb  3.5  /  TBili  0.5  /  DBili  x   /  AST  29  /  ALT  60  /  AlkPhos  78       eGFR if Non African American: 90 mL/min/1.73M2 (20 @ 05:00)  eGFR if African American: 105 mL/min/1.73M2 (20 @ 05:00)               TSH 1.85   TSH with FT4 reflex --  Total T3 87              POCT Blood Glucose.: 280 mg/dL (23 Dec 2020 07:56)  POCT Blood Glucose.: 244 mg/dL (22 Dec 2020 21:28)  POCT Blood Glucose.: 251 mg/dL (22 Dec 2020 16:39)  POCT Blood Glucose.: 351 mg/dL (22 Dec 2020 11:58)      Urinalysis Basic - ( 22 Dec 2020 12:05 )    Color: Yellow / Appearance: very cloudy / S.025 / pH: x  Gluc: x / Ketone: Trace  / Bili: Negative / Urobili: Negative   Blood: x / Protein: 15 / Nitrite: Negative   Leuk Esterase: Negative / RBC: >50 /HPF / WBC 0-2 /HPF   Sq Epi: x / Non Sq Epi: Neg.-Few / Bacteria: Negative /HPF          RADIOLOGY & ADDITIONAL TESTS:    Care Discussed with Consultants/Other Providers:

## 2020-12-24 LAB
A1C WITH ESTIMATED AVERAGE GLUCOSE RESULT: 7.6 % — HIGH (ref 4–5.6)
ALBUMIN SERPL ELPH-MCNC: 4 G/DL — SIGNIFICANT CHANGE UP (ref 3.3–5)
ALP SERPL-CCNC: 89 U/L — SIGNIFICANT CHANGE UP (ref 40–120)
ALT FLD-CCNC: 50 U/L — HIGH (ref 10–45)
ANION GAP SERPL CALC-SCNC: 16 MMOL/L — SIGNIFICANT CHANGE UP (ref 5–17)
AST SERPL-CCNC: 25 U/L — SIGNIFICANT CHANGE UP (ref 10–40)
BILIRUB SERPL-MCNC: 0.6 MG/DL — SIGNIFICANT CHANGE UP (ref 0.2–1.2)
BUN SERPL-MCNC: 30 MG/DL — HIGH (ref 7–23)
CALCIUM SERPL-MCNC: 9.8 MG/DL — SIGNIFICANT CHANGE UP (ref 8.4–10.5)
CHLORIDE SERPL-SCNC: 101 MMOL/L — SIGNIFICANT CHANGE UP (ref 96–108)
CO2 SERPL-SCNC: 22 MMOL/L — SIGNIFICANT CHANGE UP (ref 22–31)
CREAT SERPL-MCNC: 0.78 MG/DL — SIGNIFICANT CHANGE UP (ref 0.5–1.3)
ESTIMATED AVERAGE GLUCOSE: 171 MG/DL — HIGH (ref 68–114)
GLUCOSE SERPL-MCNC: 286 MG/DL — HIGH (ref 70–99)
HCT VFR BLD CALC: 40 % — SIGNIFICANT CHANGE UP (ref 34.5–45)
HGB BLD-MCNC: 13.6 G/DL — SIGNIFICANT CHANGE UP (ref 11.5–15.5)
MCHC RBC-ENTMCNC: 29 PG — SIGNIFICANT CHANGE UP (ref 27–34)
MCHC RBC-ENTMCNC: 34 GM/DL — SIGNIFICANT CHANGE UP (ref 32–36)
MCV RBC AUTO: 85.3 FL — SIGNIFICANT CHANGE UP (ref 80–100)
NRBC # BLD: 0 /100 WBCS — SIGNIFICANT CHANGE UP (ref 0–0)
PLATELET # BLD AUTO: 210 K/UL — SIGNIFICANT CHANGE UP (ref 150–400)
POTASSIUM SERPL-MCNC: 4.1 MMOL/L — SIGNIFICANT CHANGE UP (ref 3.5–5.3)
POTASSIUM SERPL-SCNC: 4.1 MMOL/L — SIGNIFICANT CHANGE UP (ref 3.5–5.3)
PROT SERPL-MCNC: 7.8 G/DL — SIGNIFICANT CHANGE UP (ref 6–8.3)
RBC # BLD: 4.69 M/UL — SIGNIFICANT CHANGE UP (ref 3.8–5.2)
RBC # FLD: 13 % — SIGNIFICANT CHANGE UP (ref 10.3–14.5)
SODIUM SERPL-SCNC: 139 MMOL/L — SIGNIFICANT CHANGE UP (ref 135–145)
WBC # BLD: 10.65 K/UL — HIGH (ref 3.8–10.5)
WBC # FLD AUTO: 10.65 K/UL — HIGH (ref 3.8–10.5)

## 2020-12-24 PROCEDURE — 99232 SBSQ HOSP IP/OBS MODERATE 35: CPT

## 2020-12-24 RX ORDER — INSULIN GLARGINE 100 [IU]/ML
30 INJECTION, SOLUTION SUBCUTANEOUS AT BEDTIME
Refills: 0 | Status: DISCONTINUED | OUTPATIENT
Start: 2020-12-24 | End: 2020-12-25

## 2020-12-24 RX ORDER — INSULIN LISPRO 100/ML
10 VIAL (ML) SUBCUTANEOUS
Refills: 0 | Status: DISCONTINUED | OUTPATIENT
Start: 2020-12-24 | End: 2020-12-30

## 2020-12-24 RX ADMIN — Medication 6: at 08:21

## 2020-12-24 RX ADMIN — Medication 10: at 12:09

## 2020-12-24 RX ADMIN — TICAGRELOR 90 MILLIGRAM(S): 90 TABLET ORAL at 17:57

## 2020-12-24 RX ADMIN — Medication 10 UNIT(S): at 17:58

## 2020-12-24 RX ADMIN — TICAGRELOR 90 MILLIGRAM(S): 90 TABLET ORAL at 06:00

## 2020-12-24 RX ADMIN — Medication 10 UNIT(S): at 12:09

## 2020-12-24 RX ADMIN — Medication 1: at 21:34

## 2020-12-24 RX ADMIN — Medication 100 MILLIGRAM(S): at 12:09

## 2020-12-24 RX ADMIN — Medication 1 TABLET(S): at 12:09

## 2020-12-24 RX ADMIN — INSULIN GLARGINE 30 UNIT(S): 100 INJECTION, SOLUTION SUBCUTANEOUS at 21:34

## 2020-12-24 RX ADMIN — ATORVASTATIN CALCIUM 80 MILLIGRAM(S): 80 TABLET, FILM COATED ORAL at 21:35

## 2020-12-24 RX ADMIN — SENNA PLUS 2 TABLET(S): 8.6 TABLET ORAL at 21:35

## 2020-12-24 RX ADMIN — Medication 81 MILLIGRAM(S): at 12:09

## 2020-12-24 RX ADMIN — Medication 75 MICROGRAM(S): at 06:00

## 2020-12-24 RX ADMIN — LISINOPRIL 40 MILLIGRAM(S): 2.5 TABLET ORAL at 06:00

## 2020-12-24 RX ADMIN — Medication 6: at 17:58

## 2020-12-24 RX ADMIN — ENOXAPARIN SODIUM 40 MILLIGRAM(S): 100 INJECTION SUBCUTANEOUS at 12:09

## 2020-12-24 RX ADMIN — Medication 250 MILLIGRAM(S): at 06:00

## 2020-12-24 RX ADMIN — Medication 250 MILLIGRAM(S): at 17:57

## 2020-12-24 RX ADMIN — AMLODIPINE BESYLATE 5 MILLIGRAM(S): 2.5 TABLET ORAL at 06:00

## 2020-12-24 RX ADMIN — Medication 500 MILLIGRAM(S): at 06:00

## 2020-12-24 RX ADMIN — PANTOPRAZOLE SODIUM 40 MILLIGRAM(S): 20 TABLET, DELAYED RELEASE ORAL at 06:00

## 2020-12-24 RX ADMIN — Medication 500 MILLIGRAM(S): at 17:57

## 2020-12-24 NOTE — PROGRESS NOTE ADULT - SUBJECTIVE AND OBJECTIVE BOX
Patient is a 65y old  Female who presents with a chief complaint of Left Pontine CVA with left sided weakness of upper extremities and bilateral lower extremity weakness  Impairment code: 01.3 Bilateral Involvement (23 Dec 2020 17:31)      Patient seen and examined at bedside.    ALLERGIES:  No Known Allergies    MEDICATIONS  (STANDING):  amantadine 100 milliGRAM(s) Oral daily  amLODIPine   Tablet 5 milliGRAM(s) Oral daily  aspirin enteric coated 81 milliGRAM(s) Oral daily  atorvastatin 80 milliGRAM(s) Oral at bedtime  ciprofloxacin     Tablet 500 milliGRAM(s) Oral every 12 hours  dextrose 40% Gel 15 Gram(s) Oral once  dextrose 5%. 1000 milliLiter(s) (50 mL/Hr) IV Continuous <Continuous>  dextrose 5%. 1000 milliLiter(s) (100 mL/Hr) IV Continuous <Continuous>  dextrose 50% Injectable 25 Gram(s) IV Push once  dextrose 50% Injectable 12.5 Gram(s) IV Push once  dextrose 50% Injectable 25 Gram(s) IV Push once  enoxaparin Injectable 40 milliGRAM(s) SubCutaneous daily  glucagon  Injectable 1 milliGRAM(s) IntraMuscular once  insulin glargine Injectable (LANTUS) 30 Unit(s) SubCutaneous at bedtime  insulin lispro (ADMELOG) corrective regimen sliding scale   SubCutaneous at bedtime  insulin lispro (ADMELOG) corrective regimen sliding scale   SubCutaneous three times a day before meals  insulin lispro Injectable (ADMELOG) 10 Unit(s) SubCutaneous three times a day before meals  levothyroxine 75 MICROGram(s) Oral daily  lisinopril 40 milliGRAM(s) Oral daily  multivitamin 1 Tablet(s) Oral daily  pantoprazole    Tablet 40 milliGRAM(s) Oral before breakfast  saccharomyces boulardii 250 milliGRAM(s) Oral two times a day  senna 2 Tablet(s) Oral at bedtime  ticagrelor 90 milliGRAM(s) Oral two times a day    MEDICATIONS  (PRN):  acetaminophen   Tablet .. 650 milliGRAM(s) Oral every 6 hours PRN Mild Pain (1 - 3), Moderate Pain (4 - 6)  ALBUTerol    90 MICROgram(s) HFA Inhaler 1 Puff(s) Inhalation every 4 hours PRN Shortness of Breath and/or Wheezing  polyethylene glycol 3350 17 Gram(s) Oral daily PRN Constipation    Vital Signs Last 24 Hrs  T(F): 98.4 (23 Dec 2020 21:03), Max: 98.9 (23 Dec 2020 08:43)  HR: 68 (24 Dec 2020 05:59) (68 - 95)  BP: 113/66 (24 Dec 2020 05:59) (110/70 - 135/77)  RR: 15 (23 Dec 2020 21:03) (15 - 16)  SpO2: 97% (23 Dec 2020 21:03) (97% - 97%)  I&O's Summary      PHYSICAL EXAM:  General: NAD, A/O x 3  ENT: MMM  Neck: Supple, No JVD  Lungs: Clear to auscultation bilaterally  Cardio: RRR, S1/S2, No murmurs  Abdomen: Soft, Nontender, Nondistended; Bowel sounds present  Extremities: No calf tenderness, No pitting edema    LABS:                        13.6   10.65 )-----------( 210      ( 24 Dec 2020 06:59 )             40.0           140  |  104  |  23  ----------------------------<  260  3.5   |  29  |  0.70    Ca    9.2      22 Dec 2020 05:00    TPro  7.0  /  Alb  3.5  /  TBili  0.5  /  DBili  x   /  AST  29  /  ALT  60  /  AlkPhos  78       eGFR if Non African American: 90 mL/min/1.73M2 (20 @ 05:00)  eGFR if African American: 105 mL/min/1.73M2 (20 @ 05:00)               TSH 1.85   TSH with FT4 reflex --  Total T3 87              POCT Blood Glucose.: 283 mg/dL (24 Dec 2020 07:25)  POCT Blood Glucose.: 297 mg/dL (23 Dec 2020 21:07)  POCT Blood Glucose.: 318 mg/dL (23 Dec 2020 16:50)  POCT Blood Glucose.: 321 mg/dL (23 Dec 2020 12:11)      Urinalysis Basic - ( 22 Dec 2020 12:05 )    Color: Yellow / Appearance: very cloudy / S.025 / pH: x  Gluc: x / Ketone: Trace  / Bili: Negative / Urobili: Negative   Blood: x / Protein: 15 / Nitrite: Negative   Leuk Esterase: Negative / RBC: >50 /HPF / WBC 0-2 /HPF   Sq Epi: x / Non Sq Epi: Neg.-Few / Bacteria: Negative /HPF        Culture - Urine (collected 22 Dec 2020 17:00)  Source: .Urine Clean Catch (Midstream)  Final Report (23 Dec 2020 18:04):    <10,000 CFU/mL Normal Urogenital Arabella        RADIOLOGY & ADDITIONAL TESTS:    Care Discussed with Consultants/Other Providers:

## 2020-12-24 NOTE — PROGRESS NOTE ADULT - SUBJECTIVE AND OBJECTIVE BOX
HPI:  Patient is a 65 year old female RH-dominant with PMH including HTN, HLD, +LOOP, asthma, hypothyroidism, DM, CVA x 3 (last in 2018) with residual left sided weakness and mild dysarthria, who presented to [facility] on 12/14/20 with worsening left sided weakness and slurring. Son also reported that the patient fell 3 times on the day of admission; patient herself denied pain. Imaging revealed acute L pontine infarct. Motor strength 3/5 LUE, 4/5 LLE, 5/5 . Intact sensation. Cleared by SLP for soft solid, nectar-thick liquid diet. Patient is on ASA, tigraglecor and atorvastatin.    Hospital Course complicated by UTI on course of PO Cipro 500 q12h. ENT was consulted for noted asymmetry in the nasopharynx on CT; no pathology noted on fiberoptic laryngoscopy. Bilateral TVC movement was noted, with no upper airway obstruction. No ENT surgical intervention recommended. Patient was stabilized and discharged to [facility] for Acute inpatient rehab on 12/21/20. (21 Dec 2020 14:36)      INTERVAL HPI/OVERNIGHT EVENTS:  Chart Reviewed and patient seen at bedside.  Patient states she slept much better last night after moving to another room.  She says her appetite as been good and her mood has been unchanged. Was told by her RN that she was arguing with her  on the phone, but patient was unwilling to endorse anything to me.  Otherwise +BM, and no other issues.    ROS: Denies fevers, chills, chest pain, shortness of breath, abdominal pain, headaches, nausea/vomiting.      Vital Signs Last 24 Hrs  T(C): 36.4 (24 Dec 2020 08:00), Max: 36.9 (23 Dec 2020 21:03)  T(F): 97.6 (24 Dec 2020 08:00), Max: 98.4 (23 Dec 2020 21:03)  HR: 76 (24 Dec 2020 08:00) (68 - 76)  BP: 96/83 (24 Dec 2020 08:00) (96/83 - 113/66)  BP(mean): --  RR: 16 (24 Dec 2020 08:00) (15 - 16)  SpO2: 97% (24 Dec 2020 08:00) (97% - 97%)    PHYSICAL EXAM:    · Constitutional	detailed exam  · Constitutional Comments	sustained eye opening, reduced attention  · Respiratory	detailed exam  · Respiratory Details	respirations non-labored; no rales; no rhonchi; no subcutaneous emphysema; no wheezes  · Cardiovascular	detailed exam  · Cardiovascular Details	regular rate and rhythm  · Gastrointestinal	detailed exam  · GI Normal	soft; nontender; bowel sounds normal  · Extremities	detailed exam  · Extremities Comments	bilateral calves soft, NT no erythema or warmth      LABS:  12-24    139  |  101  |  30<H>  ----------------------------<  286<H>  4.1   |  22  |  0.78  12-22    140  |  104  |  23  ----------------------------<  260<H>  3.5   |  29  |  0.70    Ca    9.8      24 Dec 2020 06:59  Ca    9.2      22 Dec 2020 05:00    TPro  7.8  /  Alb  4.0  /  TBili  0.6  /  DBili  x   /  AST  25  /  ALT  50<H>  /  AlkPhos  89  12-24  TPro  7.0  /  Alb  3.5  /  TBili  0.5  /  DBili  x   /  AST  29  /  ALT  60<H>  /  AlkPhos  78  12-22                                              13.6   10.65 )-----------( 210      ( 24 Dec 2020 06:59 )             40.0                         12.5   8.34  )-----------( 160      ( 22 Dec 2020 05:00 )             37.1     CAPILLARY BLOOD GLUCOSE      POCT Blood Glucose.: 372 mg/dL (24 Dec 2020 12:07)  POCT Blood Glucose.: 388 mg/dL (24 Dec 2020 12:05)  POCT Blood Glucose.: 283 mg/dL (24 Dec 2020 07:25)  POCT Blood Glucose.: 297 mg/dL (23 Dec 2020 21:07)  POCT Blood Glucose.: 318 mg/dL (23 Dec 2020 16:50)      MEDICATIONS:  MEDICATIONS  (STANDING):  amantadine 100 milliGRAM(s) Oral daily  amLODIPine   Tablet 5 milliGRAM(s) Oral daily  aspirin enteric coated 81 milliGRAM(s) Oral daily  atorvastatin 80 milliGRAM(s) Oral at bedtime  ciprofloxacin     Tablet 500 milliGRAM(s) Oral every 12 hours  dextrose 40% Gel 15 Gram(s) Oral once  dextrose 5%. 1000 milliLiter(s) (50 mL/Hr) IV Continuous <Continuous>  dextrose 5%. 1000 milliLiter(s) (100 mL/Hr) IV Continuous <Continuous>  dextrose 50% Injectable 25 Gram(s) IV Push once  dextrose 50% Injectable 12.5 Gram(s) IV Push once  dextrose 50% Injectable 25 Gram(s) IV Push once  enoxaparin Injectable 40 milliGRAM(s) SubCutaneous daily  glucagon  Injectable 1 milliGRAM(s) IntraMuscular once  insulin glargine Injectable (LANTUS) 30 Unit(s) SubCutaneous at bedtime  insulin lispro (ADMELOG) corrective regimen sliding scale   SubCutaneous at bedtime  insulin lispro (ADMELOG) corrective regimen sliding scale   SubCutaneous three times a day before meals  insulin lispro Injectable (ADMELOG) 10 Unit(s) SubCutaneous three times a day before meals  levothyroxine 75 MICROGram(s) Oral daily  lisinopril 40 milliGRAM(s) Oral daily  multivitamin 1 Tablet(s) Oral daily  pantoprazole    Tablet 40 milliGRAM(s) Oral before breakfast  saccharomyces boulardii 250 milliGRAM(s) Oral two times a day  senna 2 Tablet(s) Oral at bedtime  ticagrelor 90 milliGRAM(s) Oral two times a day    MEDICATIONS  (PRN):  acetaminophen   Tablet .. 650 milliGRAM(s) Oral every 6 hours PRN Mild Pain (1 - 3), Moderate Pain (4 - 6)  ALBUTerol    90 MICROgram(s) HFA Inhaler 1 Puff(s) Inhalation every 4 hours PRN Shortness of Breath and/or Wheezing  polyethylene glycol 3350 17 Gram(s) Oral daily PRN Constipation         HPI:  Patient is a 65 year old female RH-dominant with PMH including HTN, HLD, +LOOP, asthma, hypothyroidism, DM, CVA x 3 (last in 2018) with residual left sided weakness and mild dysarthria, who presented to [facility] on 12/14/20 with worsening left sided weakness and slurring. Son also reported that the patient fell 3 times on the day of admission; patient herself denied pain. Imaging revealed acute L pontine infarct. Motor strength 3/5 LUE, 4/5 LLE, 5/5 . Intact sensation. Cleared by SLP for soft solid, nectar-thick liquid diet. Patient is on ASA, tigraglecor and atorvastatin.    Hospital Course complicated by UTI on course of PO Cipro 500 q12h. ENT was consulted for noted asymmetry in the nasopharynx on CT; no pathology noted on fiberoptic laryngoscopy. Bilateral TVC movement was noted, with no upper airway obstruction. No ENT surgical intervention recommended. Patient was stabilized and discharged to [facility] for Acute inpatient rehab on 12/21/20. (21 Dec 2020 14:36)      INTERVAL HPI/OVERNIGHT EVENTS:  Chart Reviewed and patient seen at bedside. Overall arousal significantly improved, states slept much better last night after moving to another room. Affect improved  She says her appetite as been good and her mood has been unchanged. Was told by her RN that she was arguing with her  on the phone, but patient was unwilling to endorse anything to me.  Otherwise +BM, and no other issues.    Still has some cough, productive whitish sputum    ROS: Denies fevers, chills, chest pain, shortness of breath, abdominal pain, headaches, nausea/vomiting.      Vital Signs Last 24 Hrs  T(C): 36.4 (24 Dec 2020 08:00), Max: 36.9 (23 Dec 2020 21:03)  T(F): 97.6 (24 Dec 2020 08:00), Max: 98.4 (23 Dec 2020 21:03)  HR: 76 (24 Dec 2020 08:00) (68 - 76)  BP: 96/83 (24 Dec 2020 08:00) (96/83 - 113/66)  BP(mean): --  RR: 16 (24 Dec 2020 08:00) (15 - 16)  SpO2: 97% (24 Dec 2020 08:00) (97% - 97%)    PHYSICAL EXAM:    · Constitutional	detailed exam  · Constitutional Comments	imrpoved overall arousal, better mood. O x 3  speaking Mandarin, Cantonese and English  improved initiaton  · Respiratory	detailed exam  · Respiratory Details	respirations non-labored; no rales; no rhonchi; no subcutaneous emphysema; no wheezes  · Cardiovascular	detailed exam  · Cardiovascular Details	regular rate and rhythm  · Gastrointestinal	detailed exam  · GI Normal	soft; nontender; bowel sounds normal  · Extremities	detailed exam  · Extremities Comments	bilateral calves soft, NT no erythema or warmth      LABS:  12-24    139  |  101  |  30<H>  ----------------------------<  286<H>  4.1   |  22  |  0.78  12-22    140  |  104  |  23  ----------------------------<  260<H>  3.5   |  29  |  0.70    Ca    9.8      24 Dec 2020 06:59  Ca    9.2      22 Dec 2020 05:00    TPro  7.8  /  Alb  4.0  /  TBili  0.6  /  DBili  x   /  AST  25  /  ALT  50<H>  /  AlkPhos  89  12-24  TPro  7.0  /  Alb  3.5  /  TBili  0.5  /  DBili  x   /  AST  29  /  ALT  60<H>  /  AlkPhos  78  12-22                                              13.6   10.65 )-----------( 210      ( 24 Dec 2020 06:59 )             40.0                         12.5   8.34  )-----------( 160      ( 22 Dec 2020 05:00 )             37.1     CAPILLARY BLOOD GLUCOSE      POCT Blood Glucose.: 372 mg/dL (24 Dec 2020 12:07)  POCT Blood Glucose.: 388 mg/dL (24 Dec 2020 12:05)  POCT Blood Glucose.: 283 mg/dL (24 Dec 2020 07:25)  POCT Blood Glucose.: 297 mg/dL (23 Dec 2020 21:07)  POCT Blood Glucose.: 318 mg/dL (23 Dec 2020 16:50)      MEDICATIONS:  MEDICATIONS  (STANDING):  amantadine 100 milliGRAM(s) Oral daily  amLODIPine   Tablet 5 milliGRAM(s) Oral daily  aspirin enteric coated 81 milliGRAM(s) Oral daily  atorvastatin 80 milliGRAM(s) Oral at bedtime  ciprofloxacin     Tablet 500 milliGRAM(s) Oral every 12 hours  dextrose 40% Gel 15 Gram(s) Oral once  dextrose 5%. 1000 milliLiter(s) (50 mL/Hr) IV Continuous <Continuous>  dextrose 5%. 1000 milliLiter(s) (100 mL/Hr) IV Continuous <Continuous>  dextrose 50% Injectable 25 Gram(s) IV Push once  dextrose 50% Injectable 12.5 Gram(s) IV Push once  dextrose 50% Injectable 25 Gram(s) IV Push once  enoxaparin Injectable 40 milliGRAM(s) SubCutaneous daily  glucagon  Injectable 1 milliGRAM(s) IntraMuscular once  insulin glargine Injectable (LANTUS) 30 Unit(s) SubCutaneous at bedtime  insulin lispro (ADMELOG) corrective regimen sliding scale   SubCutaneous at bedtime  insulin lispro (ADMELOG) corrective regimen sliding scale   SubCutaneous three times a day before meals  insulin lispro Injectable (ADMELOG) 10 Unit(s) SubCutaneous three times a day before meals  levothyroxine 75 MICROGram(s) Oral daily  lisinopril 40 milliGRAM(s) Oral daily  multivitamin 1 Tablet(s) Oral daily  pantoprazole    Tablet 40 milliGRAM(s) Oral before breakfast  saccharomyces boulardii 250 milliGRAM(s) Oral two times a day  senna 2 Tablet(s) Oral at bedtime  ticagrelor 90 milliGRAM(s) Oral two times a day    MEDICATIONS  (PRN):  acetaminophen   Tablet .. 650 milliGRAM(s) Oral every 6 hours PRN Mild Pain (1 - 3), Moderate Pain (4 - 6)  ALBUTerol    90 MICROgram(s) HFA Inhaler 1 Puff(s) Inhalation every 4 hours PRN Shortness of Breath and/or Wheezing  polyethylene glycol 3350 17 Gram(s) Oral daily PRN Constipation

## 2020-12-24 NOTE — PROGRESS NOTE ADULT - ASSESSMENT
66 yo F with pmhx as above found with acute L pontine CVA admitted to acute rehab.      L pontine stroke  ASA and Brilinta  lipitor 80mg  PT/OT/SLP as per rehab  fall precaution    HTN  BP at goal  amlodipine 5mg  linsinopril 40mg    UTI   diagnosed at [facility]  unclear when pt started abx and no end date.   cipro x 3d and then stop    T2DM with hyperglycemia  Hba1c 7.5  increased lantus to 30 units qhs  increased admelog to 10 units TID w meals  continue accuchecks/SSI with meals and at bedtime    HLD  statin    hypothyroid  synthroid    DVT ppx

## 2020-12-24 NOTE — CHART NOTE - NSCHARTNOTEFT_GEN_A_CORE
Nutrition Follow Up Note  Hospital Course   (Per Electronic Medical Record)    Source:  Patient [X]  Medical Record [X]      Diet:   Consistent Carbohydrate Low Sodium Soft (Dysphagia 3-Soft & Bite Sized) Diet w/ Nectar Thick Liquids  Tolerates Diet Consistency Well & Tolerates Fluid Consistency Well   No Chewing or Significant Swallowing Difficulties - Continues on Speech Therapy   No Recent Nausea, Vomiting, Diarrhea or Constipation (as Per Patient)  States Good PO Intake/Appetite   on Glucerna 8oz PO BID (Provides 440kcal-20grams of Protein)  Patient Takes Nutrition Supplement Well  Education Provided on Need for Supplementation  Obtained Food Preferences from Patient    Enteral/Parenteral Nutrition: Not Applicable    Current Weight: 117.2lb on 12/21  Obtain New Weight  Obtain Weights Weekly     Pertinent Medications: MEDICATIONS  (STANDING):  amantadine 100 milliGRAM(s) Oral daily  amLODIPine   Tablet 5 milliGRAM(s) Oral daily  aspirin enteric coated 81 milliGRAM(s) Oral daily  atorvastatin 80 milliGRAM(s) Oral at bedtime  ciprofloxacin     Tablet 500 milliGRAM(s) Oral every 12 hours  dextrose 40% Gel 15 Gram(s) Oral once  dextrose 5%. 1000 milliLiter(s) (50 mL/Hr) IV Continuous <Continuous>  dextrose 5%. 1000 milliLiter(s) (100 mL/Hr) IV Continuous <Continuous>  dextrose 50% Injectable 25 Gram(s) IV Push once  dextrose 50% Injectable 12.5 Gram(s) IV Push once  dextrose 50% Injectable 25 Gram(s) IV Push once  enoxaparin Injectable 40 milliGRAM(s) SubCutaneous daily  glucagon  Injectable 1 milliGRAM(s) IntraMuscular once  insulin glargine Injectable (LANTUS) 30 Unit(s) SubCutaneous at bedtime  insulin lispro (ADMELOG) corrective regimen sliding scale   SubCutaneous at bedtime  insulin lispro (ADMELOG) corrective regimen sliding scale   SubCutaneous three times a day before meals  insulin lispro Injectable (ADMELOG) 10 Unit(s) SubCutaneous three times a day before meals  levothyroxine 75 MICROGram(s) Oral daily  lisinopril 40 milliGRAM(s) Oral daily  multivitamin 1 Tablet(s) Oral daily  pantoprazole    Tablet 40 milliGRAM(s) Oral before breakfast  saccharomyces boulardii 250 milliGRAM(s) Oral two times a day  senna 2 Tablet(s) Oral at bedtime  ticagrelor 90 milliGRAM(s) Oral two times a day    MEDICATIONS  (PRN):  acetaminophen   Tablet .. 650 milliGRAM(s) Oral every 6 hours PRN Mild Pain (1 - 3), Moderate Pain (4 - 6)  ALBUTerol    90 MICROgram(s) HFA Inhaler 1 Puff(s) Inhalation every 4 hours PRN Shortness of Breath and/or Wheezing  polyethylene glycol 3350 17 Gram(s) Oral daily PRN Constipation    Pertinent Labs:  12-24 Na139 mmol/L Glu 286 mg/dL<H> K+ 4.1 mmol/L Cr  0.78 mg/dL BUN 30 mg/dL<H> 12-24 Alb 4.0 g/dL    POCT (over Last 2 Days) - Ranging from 250-351    Skin: No Pressure Ulcers     Edema: None Noted     Last Bowel Movement: on 12/22    Estimated Needs:   [X] No Change Since Previous Assessment    Previous Nutrition Diagnosis:   Moderate Malnutrition  Chewing/Swallowing Difficulties     Nutrition Diagnosis is [X] Ongoing - Continues on Nutrition Supplement & Soft (Dysphagia 3-Soft & Bite Sized) Diet w/ Nectar Thick Liquids; Patient Takes Nutrition Supplement & Education Provided on Need for Supplementation     New Nutrition Diagnosis: [X] Not Applicable    Interventions:   1. Education Provided on Need for Supplementation   2. Recommend Continue Nutrition Plan of Care     Monitoring & Evaluation:   [X] Weights   [X] PO Intake   [X] Skin Integrity   [X] Follow Up (Per Protocol)  [X] Tolerance to Diet Prescription   [X] Other: Labs & POCT    Registered Dietitian/Nutritionist Remains Available.  Wilfrid Be RDN    Pager # [phone]  Phone# [phone]

## 2020-12-25 LAB
ANION GAP SERPL CALC-SCNC: 12 MMOL/L — SIGNIFICANT CHANGE UP (ref 5–17)
BUN SERPL-MCNC: 30 MG/DL — HIGH (ref 7–23)
CALCIUM SERPL-MCNC: 9.6 MG/DL — SIGNIFICANT CHANGE UP (ref 8.4–10.5)
CHLORIDE SERPL-SCNC: 102 MMOL/L — SIGNIFICANT CHANGE UP (ref 96–108)
CO2 SERPL-SCNC: 25 MMOL/L — SIGNIFICANT CHANGE UP (ref 22–31)
CREAT SERPL-MCNC: 0.75 MG/DL — SIGNIFICANT CHANGE UP (ref 0.5–1.3)
GLUCOSE SERPL-MCNC: 256 MG/DL — HIGH (ref 70–99)
POTASSIUM SERPL-MCNC: 4 MMOL/L — SIGNIFICANT CHANGE UP (ref 3.5–5.3)
POTASSIUM SERPL-SCNC: 4 MMOL/L — SIGNIFICANT CHANGE UP (ref 3.5–5.3)
SODIUM SERPL-SCNC: 139 MMOL/L — SIGNIFICANT CHANGE UP (ref 135–145)

## 2020-12-25 PROCEDURE — 99232 SBSQ HOSP IP/OBS MODERATE 35: CPT

## 2020-12-25 RX ORDER — INSULIN GLARGINE 100 [IU]/ML
35 INJECTION, SOLUTION SUBCUTANEOUS AT BEDTIME
Refills: 0 | Status: DISCONTINUED | OUTPATIENT
Start: 2020-12-25 | End: 2020-12-26

## 2020-12-25 RX ADMIN — Medication 10 UNIT(S): at 17:22

## 2020-12-25 RX ADMIN — Medication 1 TABLET(S): at 11:59

## 2020-12-25 RX ADMIN — INSULIN GLARGINE 35 UNIT(S): 100 INJECTION, SOLUTION SUBCUTANEOUS at 21:50

## 2020-12-25 RX ADMIN — Medication 4: at 08:15

## 2020-12-25 RX ADMIN — POLYETHYLENE GLYCOL 3350 17 GRAM(S): 17 POWDER, FOR SOLUTION ORAL at 11:59

## 2020-12-25 RX ADMIN — Medication 10 UNIT(S): at 08:14

## 2020-12-25 RX ADMIN — Medication 75 MICROGRAM(S): at 05:57

## 2020-12-25 RX ADMIN — Medication 100 MILLIGRAM(S): at 11:18

## 2020-12-25 RX ADMIN — SENNA PLUS 2 TABLET(S): 8.6 TABLET ORAL at 21:49

## 2020-12-25 RX ADMIN — Medication 10 UNIT(S): at 11:57

## 2020-12-25 RX ADMIN — ATORVASTATIN CALCIUM 80 MILLIGRAM(S): 80 TABLET, FILM COATED ORAL at 21:49

## 2020-12-25 RX ADMIN — Medication 6: at 17:22

## 2020-12-25 RX ADMIN — ENOXAPARIN SODIUM 40 MILLIGRAM(S): 100 INJECTION SUBCUTANEOUS at 11:19

## 2020-12-25 RX ADMIN — PANTOPRAZOLE SODIUM 40 MILLIGRAM(S): 20 TABLET, DELAYED RELEASE ORAL at 05:57

## 2020-12-25 RX ADMIN — Medication 81 MILLIGRAM(S): at 11:18

## 2020-12-25 RX ADMIN — Medication 8: at 11:58

## 2020-12-25 RX ADMIN — TICAGRELOR 90 MILLIGRAM(S): 90 TABLET ORAL at 05:56

## 2020-12-25 RX ADMIN — TICAGRELOR 90 MILLIGRAM(S): 90 TABLET ORAL at 17:24

## 2020-12-25 NOTE — PROGRESS NOTE ADULT - ASSESSMENT
L pontine stroke  DAPT/Statin  PT/OT/SLP as per rehab    HTN  DC norvasc as SBP 94    DM2, a1c 7.5  Inc Lantus cont lispro

## 2020-12-25 NOTE — PROGRESS NOTE ADULT - SUBJECTIVE AND OBJECTIVE BOX
HPI:  Patient is a 65 year old female RH-dominant with PMH including HTN, HLD, +LOOP, asthma, hypothyroidism, DM, CVA x 3 (last in 2018) with residual left sided weakness and mild dysarthria, who [facility] to [facility] on 12/14/20 with worsening left sided weakness and slurring. Son also reported that the patient fell 3 times on the day of admission; patient herself denied pain. Imaging revealed acute L pontine infarct. Motor strength 3/5 LUE, 4/5 LLE, 5/5 . Intact sensation. Cleared by SLP for soft solid, nectar-thick liquid diet. Patient is on ASA, tigraglecor and atorvastatin.    Hospital Course complicated by UTI on course of PO Cipro 500 q12h. ENT was consulted for noted asymmetry in the nasopharynx on CT; no pathology noted on fiberoptic laryngoscopy. Bilateral TVC movement was noted, with no upper airway obstruction. No ENT surgical intervention recommended. Patient was stabilized and discharged to [facility] for Acute inpatient rehab on 12/21/20. (21 Dec 2020 14:36)      Subjective    No new complaints      PAST MEDICAL & SURGICAL HISTORY:      MedsMEDICATIONS  (STANDING):  amantadine 100 milliGRAM(s) Oral daily  amLODIPine   Tablet 5 milliGRAM(s) Oral daily  aspirin enteric coated 81 milliGRAM(s) Oral daily  atorvastatin 80 milliGRAM(s) Oral at bedtime  dextrose 40% Gel 15 Gram(s) Oral once  dextrose 5%. 1000 milliLiter(s) (50 mL/Hr) IV Continuous <Continuous>  dextrose 5%. 1000 milliLiter(s) (100 mL/Hr) IV Continuous <Continuous>  dextrose 50% Injectable 25 Gram(s) IV Push once  dextrose 50% Injectable 12.5 Gram(s) IV Push once  dextrose 50% Injectable 25 Gram(s) IV Push once  enoxaparin Injectable 40 milliGRAM(s) SubCutaneous daily  glucagon  Injectable 1 milliGRAM(s) IntraMuscular once  insulin glargine Injectable (LANTUS) 30 Unit(s) SubCutaneous at bedtime  insulin lispro (ADMELOG) corrective regimen sliding scale   SubCutaneous at bedtime  insulin lispro (ADMELOG) corrective regimen sliding scale   SubCutaneous three times a day before meals  insulin lispro Injectable (ADMELOG) 10 Unit(s) SubCutaneous three times a day before meals  levothyroxine 75 MICROGram(s) Oral daily  lisinopril 40 milliGRAM(s) Oral daily  multivitamin 1 Tablet(s) Oral daily  pantoprazole    Tablet 40 milliGRAM(s) Oral before breakfast  senna 2 Tablet(s) Oral at bedtime  ticagrelor 90 milliGRAM(s) Oral two times a day    MEDICATIONS  (PRN):  acetaminophen   Tablet .. 650 milliGRAM(s) Oral every 6 hours PRN Mild Pain (1 - 3), Moderate Pain (4 - 6)  ALBUTerol    90 MICROgram(s) HFA Inhaler 1 Puff(s) Inhalation every 4 hours PRN Shortness of Breath and/or Wheezing  polyethylene glycol 3350 17 Gram(s) Oral daily PRN Constipation      Vital Signs Last 24 Hrs  T(C): 36.7 (25 Dec 2020 08:36), Max: 36.9 (24 Dec 2020 20:22)  T(F): 98 (25 Dec 2020 08:36), Max: 98.4 (24 Dec 2020 20:22)  HR: 83 (25 Dec 2020 08:36) (78 - 87)  BP: 110/66 (25 Dec 2020 08:36) (94/60 - 110/66)  BP(mean): --  RR: 15 (25 Dec 2020 08:36) (15 - 17)  SpO2: 95% (25 Dec 2020 08:36) (95% - 97%)  I&O's Summary      PHYSICAL EXAM:  GENERAL: NAD  NECK: Supple  NERVOUS SYSTEM:  awake and alert  HEART: S1s2 NL , RRR  CHEST/LUNG: Clear to percussion bilaterally  ABDOMEN: Soft, Nontender, Nondistended; Bowel sounds present  EXTREMITIES:  No edema      LABS:(12-24 @ 06:59)                      13.6  10.65 )-----------( 210                 40.0    Neutrophils = -- (--%)  Lymphocytes = -- (--%)  Eosinophils = -- (--%)  Basophils = -- (--%)  Monocytes = -- (--%)  Bands = --%    12-25    139  |  102  |  30<H>  ----------------------------<  256<H>  4.0   |  25  |  0.75    Ca    9.6      25 Dec 2020 06:50    TPro  7.8  /  Alb  4.0  /  TBili  0.6  /  DBili  x   /  AST  25  /  ALT  50<H>  /  AlkPhos  89  12-24          RVP:          Tox:           CAPILLARY BLOOD GLUCOSE      POCT Blood Glucose.: 330 mg/dL (25 Dec 2020 11:18)  POCT Blood Glucose.: 247 mg/dL (25 Dec 2020 08:10)  POCT Blood Glucose.: 258 mg/dL (24 Dec 2020 21:27)  POCT Blood Glucose.: 254 mg/dL (24 Dec 2020 17:08)      Culture - Urine (collected 22 Dec 2020 17:00)  Source: .Urine Clean Catch (Midstream)  Final Report (23 Dec 2020 18:04):    <10,000 CFU/mL Normal Urogenital Arabella      Imaging Personally Reviewed:  [ ] YES  [ ] NO        Care Discussed with Consultants/Other Providers [ x] YES  [ ] NO

## 2020-12-25 NOTE — PROGRESS NOTE ADULT - SUBJECTIVE AND OBJECTIVE BOX
No overnight events.  no complaints, participating with therapy     REVIEW OF SYSTEMS  Constitutional - No fever,  No fatigue  Neurological - No headaches, No loss of strength  Musculoskeletal - No joint pain, No joint swelling, No muscle pain    VITALS  T(C): 36.7 (12-25-20 @ 08:36), Max: 36.9 (12-24-20 @ 20:22)  HR: 83 (12-25-20 @ 08:36) (78 - 87)  BP: 110/66 (12-25-20 @ 08:36) (94/60 - 110/66)  RR: 15 (12-25-20 @ 08:36) (15 - 17)  SpO2: 95% (12-25-20 @ 08:36) (95% - 97%)  Wt(kg): --       MEDICATIONS   acetaminophen   Tablet .. 650 milliGRAM(s) every 6 hours PRN  ALBUTerol    90 MICROgram(s) HFA Inhaler 1 Puff(s) every 4 hours PRN  amantadine 100 milliGRAM(s) daily  amLODIPine   Tablet 5 milliGRAM(s) daily  aspirin enteric coated 81 milliGRAM(s) daily  atorvastatin 80 milliGRAM(s) at bedtime  dextrose 40% Gel 15 Gram(s) once  dextrose 5%. 1000 milliLiter(s) <Continuous>  dextrose 5%. 1000 milliLiter(s) <Continuous>  dextrose 50% Injectable 25 Gram(s) once  dextrose 50% Injectable 12.5 Gram(s) once  dextrose 50% Injectable 25 Gram(s) once  enoxaparin Injectable 40 milliGRAM(s) daily  glucagon  Injectable 1 milliGRAM(s) once  insulin glargine Injectable (LANTUS) 30 Unit(s) at bedtime  insulin lispro (ADMELOG) corrective regimen sliding scale   at bedtime  insulin lispro (ADMELOG) corrective regimen sliding scale   three times a day before meals  insulin lispro Injectable (ADMELOG) 10 Unit(s) three times a day before meals  levothyroxine 75 MICROGram(s) daily  lisinopril 40 milliGRAM(s) daily  multivitamin 1 Tablet(s) daily  pantoprazole    Tablet 40 milliGRAM(s) before breakfast  polyethylene glycol 3350 17 Gram(s) daily PRN  senna 2 Tablet(s) at bedtime  ticagrelor 90 milliGRAM(s) two times a day      RECENT LABS/IMAGING                        13.6   10.65 )-----------( 210      ( 24 Dec 2020 06:59 )             40.0     12-25    139  |  102  |  30<H>  ----------------------------<  256<H>  4.0   |  25  |  0.75    Ca    9.6      25 Dec 2020 06:50    TPro  7.8  /  Alb  4.0  /  TBili  0.6  /  DBili  x   /  AST  25  /  ALT  50<H>  /  AlkPhos  89  12-24                POCT Blood Glucose.: 330 mg/dL (12-25-20 @ 11:18)  POCT Blood Glucose.: 247 mg/dL (12-25-20 @ 08:10)  POCT Blood Glucose.: 258 mg/dL (12-24-20 @ 21:27)  POCT Blood Glucose.: 254 mg/dL (12-24-20 @ 17:08)    ---------  PHYSICAL EXAM  Constitutional - NAD, Comfortable, in bed   Pulm - Breathing comfortably  Abd - Soft, NTND  Extremities - No edema, No calf tenderness  Neurologic Exam -                    Cognitive - Awake, Alert     Communication - Fluent     Motor - No new deficits     Sensory - Intact to LT  Psychiatric - Mood WNL, Affect WNL    ASSESSMENT/PLAN  65y Female with functional deficits after left pontine stroke   on ASA, ticagrelor, atorvastatin  left upper and bilateral lower extremity weakness, dysarthria, dysphagia, and cognitive impairment  dysphagia, on soft solids/nectar thick liquids   continue Comprehensive Rehab Program of PT/OT/SLP  3 hours a day for 5 days a week  Continue current medical management  amantadine for attention  Pain - Tylenol PRN  DVT PPX - lovenox   labs monday   Continue 3hrs a day of comprehensive rehab program.

## 2020-12-26 PROCEDURE — 99232 SBSQ HOSP IP/OBS MODERATE 35: CPT

## 2020-12-26 RX ORDER — INSULIN GLARGINE 100 [IU]/ML
40 INJECTION, SOLUTION SUBCUTANEOUS AT BEDTIME
Refills: 0 | Status: DISCONTINUED | OUTPATIENT
Start: 2020-12-26 | End: 2020-12-31

## 2020-12-26 RX ADMIN — Medication 1 TABLET(S): at 11:48

## 2020-12-26 RX ADMIN — ENOXAPARIN SODIUM 40 MILLIGRAM(S): 100 INJECTION SUBCUTANEOUS at 11:48

## 2020-12-26 RX ADMIN — LISINOPRIL 40 MILLIGRAM(S): 2.5 TABLET ORAL at 05:57

## 2020-12-26 RX ADMIN — Medication 4: at 17:44

## 2020-12-26 RX ADMIN — INSULIN GLARGINE 40 UNIT(S): 100 INJECTION, SOLUTION SUBCUTANEOUS at 21:28

## 2020-12-26 RX ADMIN — Medication 6: at 11:49

## 2020-12-26 RX ADMIN — TICAGRELOR 90 MILLIGRAM(S): 90 TABLET ORAL at 05:57

## 2020-12-26 RX ADMIN — TICAGRELOR 90 MILLIGRAM(S): 90 TABLET ORAL at 17:46

## 2020-12-26 RX ADMIN — SENNA PLUS 2 TABLET(S): 8.6 TABLET ORAL at 21:28

## 2020-12-26 RX ADMIN — Medication 81 MILLIGRAM(S): at 11:48

## 2020-12-26 RX ADMIN — Medication 10 UNIT(S): at 17:44

## 2020-12-26 RX ADMIN — PANTOPRAZOLE SODIUM 40 MILLIGRAM(S): 20 TABLET, DELAYED RELEASE ORAL at 05:57

## 2020-12-26 RX ADMIN — ATORVASTATIN CALCIUM 80 MILLIGRAM(S): 80 TABLET, FILM COATED ORAL at 21:28

## 2020-12-26 RX ADMIN — Medication 100 MILLIGRAM(S): at 11:48

## 2020-12-26 RX ADMIN — Medication 4: at 08:13

## 2020-12-26 RX ADMIN — Medication 10 UNIT(S): at 11:48

## 2020-12-26 RX ADMIN — Medication 10 UNIT(S): at 08:12

## 2020-12-26 RX ADMIN — Medication 75 MICROGRAM(S): at 05:57

## 2020-12-26 NOTE — PROGRESS NOTE ADULT - SUBJECTIVE AND OBJECTIVE BOX
HPI:  Patient is a 65 year old female RH-dominant with PMH including HTN, HLD, +LOOP, asthma, hypothyroidism, DM, CVA x 3 (last in 2018) with residual left sided weakness and mild dysarthria, who presented to [facility] on 12/14/20 with worsening left sided weakness and slurring. Son also reported that the patient fell 3 times on the day of admission; patient herself denied pain. Imaging revealed acute L pontine infarct. Motor strength 3/5 LUE, 4/5 LLE, 5/5 . Intact sensation. Cleared by SLP for soft solid, nectar-thick liquid diet. Patient is on ASA, tigraglecor and atorvastatin.    Hospital Course complicated by UTI on course of PO Cipro 500 q12h. ENT was consulted for noted asymmetry in the nasopharynx on CT; no pathology noted on fiberoptic laryngoscopy. Bilateral TVC movement was noted, with no upper airway obstruction. No ENT surgical intervention recommended. Patient was stabilized and discharged to [facility] for Acute inpatient rehab on 12/21/20. (21 Dec 2020 14:36)      Subjective          PAST MEDICAL & SURGICAL HISTORY:      MedsMEDICATIONS  (STANDING):  amantadine 100 milliGRAM(s) Oral daily  aspirin enteric coated 81 milliGRAM(s) Oral daily  atorvastatin 80 milliGRAM(s) Oral at bedtime  dextrose 40% Gel 15 Gram(s) Oral once  dextrose 5%. 1000 milliLiter(s) (50 mL/Hr) IV Continuous <Continuous>  dextrose 5%. 1000 milliLiter(s) (100 mL/Hr) IV Continuous <Continuous>  dextrose 50% Injectable 25 Gram(s) IV Push once  dextrose 50% Injectable 12.5 Gram(s) IV Push once  dextrose 50% Injectable 25 Gram(s) IV Push once  enoxaparin Injectable 40 milliGRAM(s) SubCutaneous daily  glucagon  Injectable 1 milliGRAM(s) IntraMuscular once  insulin glargine Injectable (LANTUS) 35 Unit(s) SubCutaneous at bedtime  insulin lispro (ADMELOG) corrective regimen sliding scale   SubCutaneous at bedtime  insulin lispro (ADMELOG) corrective regimen sliding scale   SubCutaneous three times a day before meals  insulin lispro Injectable (ADMELOG) 10 Unit(s) SubCutaneous three times a day before meals  levothyroxine 75 MICROGram(s) Oral daily  lisinopril 40 milliGRAM(s) Oral daily  multivitamin 1 Tablet(s) Oral daily  pantoprazole    Tablet 40 milliGRAM(s) Oral before breakfast  senna 2 Tablet(s) Oral at bedtime  ticagrelor 90 milliGRAM(s) Oral two times a day    MEDICATIONS  (PRN):  acetaminophen   Tablet .. 650 milliGRAM(s) Oral every 6 hours PRN Mild Pain (1 - 3), Moderate Pain (4 - 6)  ALBUTerol    90 MICROgram(s) HFA Inhaler 1 Puff(s) Inhalation every 4 hours PRN Shortness of Breath and/or Wheezing  polyethylene glycol 3350 17 Gram(s) Oral daily PRN Constipation      Vital Signs Last 24 Hrs  T(C): 36.9 (26 Dec 2020 08:00), Max: 36.9 (25 Dec 2020 21:46)  T(F): 98.4 (26 Dec 2020 08:00), Max: 98.5 (25 Dec 2020 21:46)  HR: 67 (26 Dec 2020 08:00) (64 - 72)  BP: 111/68 (26 Dec 2020 08:00) (111/68 - 119/70)  BP(mean): --  RR: 16 (26 Dec 2020 08:00) (16 - 16)  SpO2: 99% (26 Dec 2020 08:00) (97% - 99%)  I&O's Summary      PHYSICAL EXAM:  GENERAL: NAD  NECK: Supple  NERVOUS SYSTEM:  awake and alert  HEART: S1s2 NL , RRR  CHEST/LUNG: Clear to percussion bilaterally  ABDOMEN: Soft, Nontender, Nondistended; Bowel sounds present  EXTREMITIES:  No edema      LABS:  12-25    139  |  102  |  30<H>  ----------------------------<  256<H>  4.0   |  25  |  0.75    Ca    9.6      25 Dec 2020 06:50            CAPILLARY BLOOD GLUCOSE      POCT Blood Glucose.: 272 mg/dL (26 Dec 2020 11:48)  POCT Blood Glucose.: 237 mg/dL (26 Dec 2020 08:10)  POCT Blood Glucose.: 236 mg/dL (25 Dec 2020 21:49)  POCT Blood Glucose.: 264 mg/dL (25 Dec 2020 16:52)      Imaging Personally Reviewed:  [ ] YES  [ ] NO        Care Discussed with Consultants/Other Providers [ x] YES  [ ] NO

## 2020-12-26 NOTE — PROGRESS NOTE ADULT - SUBJECTIVE AND OBJECTIVE BOX
No overnight events.  no new complaints     REVIEW OF SYSTEMS  Constitutional - No fever,  No fatigue  Neurological - No headaches, No loss of strength  Musculoskeletal - No joint pain, No joint swelling, No muscle pain    VITALS  T(C): 36.9 (12-26-20 @ 08:00), Max: 36.9 (12-25-20 @ 21:46)  HR: 67 (12-26-20 @ 08:00) (64 - 72)  BP: 111/68 (12-26-20 @ 08:00) (111/68 - 119/70)  RR: 16 (12-26-20 @ 08:00) (16 - 16)  SpO2: 99% (12-26-20 @ 08:00) (97% - 99%)  Wt(kg): --       MEDICATIONS   acetaminophen   Tablet .. 650 milliGRAM(s) every 6 hours PRN  ALBUTerol    90 MICROgram(s) HFA Inhaler 1 Puff(s) every 4 hours PRN  amantadine 100 milliGRAM(s) daily  aspirin enteric coated 81 milliGRAM(s) daily  atorvastatin 80 milliGRAM(s) at bedtime  dextrose 40% Gel 15 Gram(s) once  dextrose 5%. 1000 milliLiter(s) <Continuous>  dextrose 5%. 1000 milliLiter(s) <Continuous>  dextrose 50% Injectable 25 Gram(s) once  dextrose 50% Injectable 12.5 Gram(s) once  dextrose 50% Injectable 25 Gram(s) once  enoxaparin Injectable 40 milliGRAM(s) daily  glucagon  Injectable 1 milliGRAM(s) once  insulin glargine Injectable (LANTUS) 35 Unit(s) at bedtime  insulin lispro (ADMELOG) corrective regimen sliding scale   at bedtime  insulin lispro (ADMELOG) corrective regimen sliding scale   three times a day before meals  insulin lispro Injectable (ADMELOG) 10 Unit(s) three times a day before meals  levothyroxine 75 MICROGram(s) daily  lisinopril 40 milliGRAM(s) daily  multivitamin 1 Tablet(s) daily  pantoprazole    Tablet 40 milliGRAM(s) before breakfast  polyethylene glycol 3350 17 Gram(s) daily PRN  senna 2 Tablet(s) at bedtime  ticagrelor 90 milliGRAM(s) two times a day      RECENT LABS/IMAGING    12-25    139  |  102  |  30<H>  ----------------------------<  256<H>  4.0   |  25  |  0.75    Ca    9.6      25 Dec 2020 06:50                  POCT Blood Glucose.: 272 mg/dL (12-26-20 @ 11:48)  POCT Blood Glucose.: 237 mg/dL (12-26-20 @ 08:10)  POCT Blood Glucose.: 236 mg/dL (12-25-20 @ 21:49)  POCT Blood Glucose.: 264 mg/dL (12-25-20 @ 16:52)    ---------      ----  PHYSICAL EXAM  Constitutional - NAD, Comfortable, in bed   Pulm - Breathing comfortably  Abd - Soft, NTND  Extremities - No edema, No calf tenderness  Neurologic Exam -                    Cognitive - Awake, Alert     Communication - Fluent     Motor - No new deficits     Sensory - Intact to LT  Psychiatric - Mood WNL, Affect WNL    ASSESSMENT/PLAN  65y Female with functional deficits after left pontine stroke   on ASA, ticagrelor, atorvastatin  left upper and bilateral lower extremity weakness, dysarthria, dysphagia, and cognitive impairment  dysphagia, on soft solids/nectar thick liquids   continue Comprehensive Rehab Program of PT/OT/SLP  3 hours a day for 5 days a week  Continue current medical management  amantadine for attention  Pain - Tylenol PRN  DVT PPX - lovenox   labs monday   Continue 3hrs a day of comprehensive rehab program.

## 2020-12-26 NOTE — PROGRESS NOTE ADULT - ASSESSMENT
L pontine stroke  DAPT/Statin  PT/OT/SLP as per rehab    HTN  DC'd norvasc as SBP was 94  Cont lisinopril    DM2, a1c 7.5  Inc Lantus,  cont lispro    DVT ppx  Lovenox

## 2020-12-27 PROCEDURE — 99232 SBSQ HOSP IP/OBS MODERATE 35: CPT

## 2020-12-27 RX ADMIN — TICAGRELOR 90 MILLIGRAM(S): 90 TABLET ORAL at 05:27

## 2020-12-27 RX ADMIN — LISINOPRIL 40 MILLIGRAM(S): 2.5 TABLET ORAL at 05:26

## 2020-12-27 RX ADMIN — Medication 10 UNIT(S): at 08:04

## 2020-12-27 RX ADMIN — Medication 4: at 11:56

## 2020-12-27 RX ADMIN — Medication 100 MILLIGRAM(S): at 11:55

## 2020-12-27 RX ADMIN — Medication 75 MICROGRAM(S): at 05:26

## 2020-12-27 RX ADMIN — ENOXAPARIN SODIUM 40 MILLIGRAM(S): 100 INJECTION SUBCUTANEOUS at 11:55

## 2020-12-27 RX ADMIN — Medication 2: at 08:04

## 2020-12-27 RX ADMIN — Medication 81 MILLIGRAM(S): at 11:55

## 2020-12-27 RX ADMIN — Medication 10 UNIT(S): at 17:09

## 2020-12-27 RX ADMIN — SENNA PLUS 2 TABLET(S): 8.6 TABLET ORAL at 20:13

## 2020-12-27 RX ADMIN — Medication 1 TABLET(S): at 11:55

## 2020-12-27 RX ADMIN — POLYETHYLENE GLYCOL 3350 17 GRAM(S): 17 POWDER, FOR SOLUTION ORAL at 08:50

## 2020-12-27 RX ADMIN — Medication 2: at 17:08

## 2020-12-27 RX ADMIN — TICAGRELOR 90 MILLIGRAM(S): 90 TABLET ORAL at 17:08

## 2020-12-27 RX ADMIN — Medication 10 UNIT(S): at 11:56

## 2020-12-27 RX ADMIN — INSULIN GLARGINE 40 UNIT(S): 100 INJECTION, SOLUTION SUBCUTANEOUS at 20:13

## 2020-12-27 RX ADMIN — PANTOPRAZOLE SODIUM 40 MILLIGRAM(S): 20 TABLET, DELAYED RELEASE ORAL at 05:26

## 2020-12-27 RX ADMIN — ATORVASTATIN CALCIUM 80 MILLIGRAM(S): 80 TABLET, FILM COATED ORAL at 20:13

## 2020-12-28 LAB
ALBUMIN SERPL ELPH-MCNC: 3.6 G/DL — SIGNIFICANT CHANGE UP (ref 3.3–5)
ALP SERPL-CCNC: 79 U/L — SIGNIFICANT CHANGE UP (ref 40–120)
ALT FLD-CCNC: 45 U/L — SIGNIFICANT CHANGE UP (ref 10–45)
ANION GAP SERPL CALC-SCNC: 11 MMOL/L — SIGNIFICANT CHANGE UP (ref 5–17)
AST SERPL-CCNC: 28 U/L — SIGNIFICANT CHANGE UP (ref 10–40)
BILIRUB SERPL-MCNC: 0.4 MG/DL — SIGNIFICANT CHANGE UP (ref 0.2–1.2)
BUN SERPL-MCNC: 17 MG/DL — SIGNIFICANT CHANGE UP (ref 7–23)
CALCIUM SERPL-MCNC: 9.5 MG/DL — SIGNIFICANT CHANGE UP (ref 8.4–10.5)
CHLORIDE SERPL-SCNC: 104 MMOL/L — SIGNIFICANT CHANGE UP (ref 96–108)
CO2 SERPL-SCNC: 27 MMOL/L — SIGNIFICANT CHANGE UP (ref 22–31)
CREAT SERPL-MCNC: 0.61 MG/DL — SIGNIFICANT CHANGE UP (ref 0.5–1.3)
GLUCOSE SERPL-MCNC: 122 MG/DL — HIGH (ref 70–99)
HCT VFR BLD CALC: 36.8 % — SIGNIFICANT CHANGE UP (ref 34.5–45)
HGB BLD-MCNC: 12.2 G/DL — SIGNIFICANT CHANGE UP (ref 11.5–15.5)
MCHC RBC-ENTMCNC: 28.2 PG — SIGNIFICANT CHANGE UP (ref 27–34)
MCHC RBC-ENTMCNC: 33.2 GM/DL — SIGNIFICANT CHANGE UP (ref 32–36)
MCV RBC AUTO: 85 FL — SIGNIFICANT CHANGE UP (ref 80–100)
NRBC # BLD: 0 /100 WBCS — SIGNIFICANT CHANGE UP (ref 0–0)
PLATELET # BLD AUTO: 176 K/UL — SIGNIFICANT CHANGE UP (ref 150–400)
POTASSIUM SERPL-MCNC: 3.8 MMOL/L — SIGNIFICANT CHANGE UP (ref 3.5–5.3)
POTASSIUM SERPL-SCNC: 3.8 MMOL/L — SIGNIFICANT CHANGE UP (ref 3.5–5.3)
PROT SERPL-MCNC: 7.1 G/DL — SIGNIFICANT CHANGE UP (ref 6–8.3)
RBC # BLD: 4.33 M/UL — SIGNIFICANT CHANGE UP (ref 3.8–5.2)
RBC # FLD: 13.2 % — SIGNIFICANT CHANGE UP (ref 10.3–14.5)
SODIUM SERPL-SCNC: 142 MMOL/L — SIGNIFICANT CHANGE UP (ref 135–145)
WBC # BLD: 9.05 K/UL — SIGNIFICANT CHANGE UP (ref 3.8–10.5)
WBC # FLD AUTO: 9.05 K/UL — SIGNIFICANT CHANGE UP (ref 3.8–10.5)

## 2020-12-28 PROCEDURE — 99232 SBSQ HOSP IP/OBS MODERATE 35: CPT

## 2020-12-28 RX ADMIN — TICAGRELOR 90 MILLIGRAM(S): 90 TABLET ORAL at 17:27

## 2020-12-28 RX ADMIN — Medication 81 MILLIGRAM(S): at 12:03

## 2020-12-28 RX ADMIN — Medication 100 MILLIGRAM(S): at 12:03

## 2020-12-28 RX ADMIN — Medication 1 TABLET(S): at 12:03

## 2020-12-28 RX ADMIN — POLYETHYLENE GLYCOL 3350 17 GRAM(S): 17 POWDER, FOR SOLUTION ORAL at 16:18

## 2020-12-28 RX ADMIN — Medication 75 MICROGRAM(S): at 05:15

## 2020-12-28 RX ADMIN — SENNA PLUS 2 TABLET(S): 8.6 TABLET ORAL at 21:11

## 2020-12-28 RX ADMIN — PANTOPRAZOLE SODIUM 40 MILLIGRAM(S): 20 TABLET, DELAYED RELEASE ORAL at 05:15

## 2020-12-28 RX ADMIN — TICAGRELOR 90 MILLIGRAM(S): 90 TABLET ORAL at 05:15

## 2020-12-28 RX ADMIN — INSULIN GLARGINE 40 UNIT(S): 100 INJECTION, SOLUTION SUBCUTANEOUS at 21:11

## 2020-12-28 RX ADMIN — ATORVASTATIN CALCIUM 80 MILLIGRAM(S): 80 TABLET, FILM COATED ORAL at 21:11

## 2020-12-28 RX ADMIN — Medication 2: at 12:02

## 2020-12-28 RX ADMIN — Medication 2: at 17:27

## 2020-12-28 RX ADMIN — Medication 10 UNIT(S): at 12:02

## 2020-12-28 RX ADMIN — ENOXAPARIN SODIUM 40 MILLIGRAM(S): 100 INJECTION SUBCUTANEOUS at 12:03

## 2020-12-28 RX ADMIN — Medication 10 UNIT(S): at 08:10

## 2020-12-28 RX ADMIN — Medication 10 UNIT(S): at 17:27

## 2020-12-28 NOTE — PROGRESS NOTE ADULT - SUBJECTIVE AND OBJECTIVE BOX
Patient is a 65y old  Female who presents with a chief complaint of Left Pontine CVA with left sided weakness of upper extremities and bilateral lower extremity weakness  Impairment code: 01.3 Bilateral Involvement (27 Dec 2020 11:53)      Patient seen and examined at bedside.     ALLERGIES:  No Known Allergies    MEDICATIONS  (STANDING):  amantadine 100 milliGRAM(s) Oral daily  aspirin enteric coated 81 milliGRAM(s) Oral daily  atorvastatin 80 milliGRAM(s) Oral at bedtime  dextrose 40% Gel 15 Gram(s) Oral once  dextrose 5%. 1000 milliLiter(s) (50 mL/Hr) IV Continuous <Continuous>  dextrose 5%. 1000 milliLiter(s) (100 mL/Hr) IV Continuous <Continuous>  dextrose 50% Injectable 25 Gram(s) IV Push once  dextrose 50% Injectable 12.5 Gram(s) IV Push once  dextrose 50% Injectable 25 Gram(s) IV Push once  enoxaparin Injectable 40 milliGRAM(s) SubCutaneous daily  glucagon  Injectable 1 milliGRAM(s) IntraMuscular once  insulin glargine Injectable (LANTUS) 40 Unit(s) SubCutaneous at bedtime  insulin lispro (ADMELOG) corrective regimen sliding scale   SubCutaneous at bedtime  insulin lispro (ADMELOG) corrective regimen sliding scale   SubCutaneous three times a day before meals  insulin lispro Injectable (ADMELOG) 10 Unit(s) SubCutaneous three times a day before meals  levothyroxine 75 MICROGram(s) Oral daily  lisinopril 40 milliGRAM(s) Oral daily  multivitamin 1 Tablet(s) Oral daily  pantoprazole    Tablet 40 milliGRAM(s) Oral before breakfast  senna 2 Tablet(s) Oral at bedtime  ticagrelor 90 milliGRAM(s) Oral two times a day    MEDICATIONS  (PRN):  acetaminophen   Tablet .. 650 milliGRAM(s) Oral every 6 hours PRN Mild Pain (1 - 3), Moderate Pain (4 - 6)  ALBUTerol    90 MICROgram(s) HFA Inhaler 1 Puff(s) Inhalation every 4 hours PRN Shortness of Breath and/or Wheezing  polyethylene glycol 3350 17 Gram(s) Oral daily PRN Constipation    Vital Signs Last 24 Hrs  T(F): 97.7 (27 Dec 2020 19:25), Max: 97.7 (27 Dec 2020 19:25)  HR: 64 (28 Dec 2020 05:12) (64 - 74)  BP: 109/68 (28 Dec 2020 05:12) (100/58 - 109/68)  RR: 16 (27 Dec 2020 19:25) (16 - 16)  SpO2: 99% (27 Dec 2020 19:25) (99% - 99%)  I&O's Summary      PHYSICAL EXAM:  General: NAD, A/O x 3  ENT: MMM  Neck: Supple, No JVD  Lungs: Clear to auscultation bilaterally  Cardio: RRR, S1/S2, No murmurs  Abdomen: Soft, Nontender, Nondistended; Bowel sounds present  Extremities: No calf tenderness, No pitting edema    LABS:                        12.2   9.05  )-----------( 176      ( 28 Dec 2020 05:30 )             36.8       12-28    142  |  104  |  17  ----------------------------<  122  3.8   |  27  |  0.61    Ca    9.5      28 Dec 2020 05:30    TPro  7.1  /  Alb  3.6  /  TBili  0.4  /  DBili  x   /  AST  28  /  ALT  45  /  AlkPhos  79  12-28     eGFR if Non African American: 94 mL/min/1.73M2 (12-28-20 @ 05:30)  eGFR if : 109 mL/min/1.73M2 (12-28-20 @ 05:30)         POCT Blood Glucose.: 148 mg/dL (28 Dec 2020 08:05)  POCT Blood Glucose.: 212 mg/dL (27 Dec 2020 20:12)  POCT Blood Glucose.: 190 mg/dL (27 Dec 2020 16:52)  POCT Blood Glucose.: 248 mg/dL (27 Dec 2020 11:54)          Culture - Urine (collected 22 Dec 2020 17:00)  Source: .Urine Clean Catch (Midstream)  Final Report (23 Dec 2020 18:04):    <10,000 CFU/mL Normal Urogenital Arabella        RADIOLOGY & ADDITIONAL TESTS:    Care Discussed with Consultants/Other Providers:

## 2020-12-28 NOTE — PROGRESS NOTE ADULT - ASSESSMENT
JEFE CONSTANTINO is a 65y F with a history of HTN, DM, HLD, +LOOP, asthma, hypothyroidism, CVA who presented to [facility] on 12/14/20 with acute Left Pontine Stroke.    #Acute Pontine Stroke with left upper and bilateral lower extremity weakness, dysarthria, dysphagia, and cognitive impairment  -continue Comprehensive Rehab Program of PT/OT/SLP  3 hours a day for 5 days a week  -neuropsychology evaluation cognition and mod  -continue secondary PPX with  ASA 81mg po daily, ticagrelor 90mg po q12h , Atorvastatin 80mg po qhs  - Dysphagia: cleared by ENT for acute structural pathologies. Currently on soft solids and nectar-thick liquids. Monitor closely due to cough,  -continue amantadine 100 mg daily  -Precautions: cardiac, DM, aspiration, fall, AMS    #HTN  - Lisinopril 40mg po daily   - amlodipine 5mg po daily  -controlled    #hypothyroidism   - Levothyroxine 75mcg po daily    #DM: uncontrolled. A1C  12/24 - 7.6  - lantus  30 u qhs   - lispro 10 u qAC   - ISS coverage  -better controlled    #Asthma  - albuterol MDI 2 pulls inhalation q4h PRN  -stable    #Skin  - Pressure injury/Skin: OOB to Chair, PT/OT     #Pain Mgmt   - Tylenol PRN    #GI/Bowel Mgmt   -  Senna, Miralax PRN  -? GERD: on protonix 40 mg daily    #/Bladder Mgmt   - bladder scan, PVR per routiune  -s/p Rx UTI with cipro x 3 days.  -optimize hydration, address constipation, mobility    #COVID-19 Surveillance  - Negatives: 12/14, 12/18, 12/21    #FEN   -Soft Diet, nectar thick liquids  [CCHO, DASH/TLC]    -CXR 12/23 negative for infiltrate or effusion  -monitor hydration status on nectar-thick liquids  -MBS 12/30    #DVT PPX:   -Lovenox  -SCD TEDs    #LAbs 12/28 reviewed, stable    #LABs  MBS 12/30  monitor FS  CBC BMP 12/31

## 2020-12-28 NOTE — PROGRESS NOTE ADULT - ASSESSMENT
L pontine stroke  DAPT/Statin  PT/OT/SLP as per rehab    HTN  DC'd norvasc as SBP was 94  Cont lisinopril 40    DM2, a1c 7.5  Inc Lantus, cont lispro    DVT ppx  Lovenox

## 2020-12-28 NOTE — PROGRESS NOTE ADULT - SUBJECTIVE AND OBJECTIVE BOX
Patient is a 65y old  Female who presents with a chief complaint of Left Pontine CVA with left sided weakness of upper extremities and bilateral lower extremity weakness  Impairment code: 01.3 Bilateral Involvement (28 Dec 2020 11:47)      HPI:  Patient is a 65 year old female RH-dominant with PMH including HTN, HLD, +LOOP, asthma, hypothyroidism, DM, CVA x 3 (last in 2018) with residual left sided weakness and mild dysarthria, who presented to [facility] on 12/14/20 with worsening left sided weakness and slurring. Son also reported that the patient fell 3 times on the day of admission; patient herself denied pain. Imaging revealed acute L pontine infarct. Motor strength 3/5 LUE, 4/5 LLE, 5/5 . Intact sensation. Cleared by SLP for soft solid, nectar-thick liquid diet. Patient is on ASA, tigraglecor and atorvastatin.    Hospital Course complicated by UTI on course of PO Cipro 500 q12h. ENT was consulted for noted asymmetry in the nasopharynx on CT; no pathology noted on fiberoptic laryngoscopy. Bilateral TVC movement was noted, with no upper airway obstruction. No ENT surgical intervention recommended. Patient was stabilized and discharged to [facility] for Acute inpatient rehab on 12/21/20. (21 Dec 2020 14:36)      PAST MEDICAL & SURGICAL HISTORY:      MEDICATIONS  (STANDING):  amantadine 100 milliGRAM(s) Oral daily  aspirin enteric coated 81 milliGRAM(s) Oral daily  atorvastatin 80 milliGRAM(s) Oral at bedtime  dextrose 40% Gel 15 Gram(s) Oral once  dextrose 5%. 1000 milliLiter(s) (50 mL/Hr) IV Continuous <Continuous>  dextrose 5%. 1000 milliLiter(s) (100 mL/Hr) IV Continuous <Continuous>  dextrose 50% Injectable 25 Gram(s) IV Push once  dextrose 50% Injectable 25 Gram(s) IV Push once  dextrose 50% Injectable 12.5 Gram(s) IV Push once  enoxaparin Injectable 40 milliGRAM(s) SubCutaneous daily  glucagon  Injectable 1 milliGRAM(s) IntraMuscular once  insulin glargine Injectable (LANTUS) 40 Unit(s) SubCutaneous at bedtime  insulin lispro (ADMELOG) corrective regimen sliding scale   SubCutaneous at bedtime  insulin lispro (ADMELOG) corrective regimen sliding scale   SubCutaneous three times a day before meals  insulin lispro Injectable (ADMELOG) 10 Unit(s) SubCutaneous three times a day before meals  levothyroxine 75 MICROGram(s) Oral daily  lisinopril 40 milliGRAM(s) Oral daily  multivitamin 1 Tablet(s) Oral daily  pantoprazole    Tablet 40 milliGRAM(s) Oral before breakfast  senna 2 Tablet(s) Oral at bedtime  ticagrelor 90 milliGRAM(s) Oral two times a day    MEDICATIONS  (PRN):  acetaminophen   Tablet .. 650 milliGRAM(s) Oral every 6 hours PRN Mild Pain (1 - 3), Moderate Pain (4 - 6)  ALBUTerol    90 MICROgram(s) HFA Inhaler 1 Puff(s) Inhalation every 4 hours PRN Shortness of Breath and/or Wheezing  polyethylene glycol 3350 17 Gram(s) Oral daily PRN Constipation      Allergies    No Known Allergies    Intolerances          VITALS  65y  Vital Signs Last 24 Hrs  T(C): 36.5 (27 Dec 2020 19:25), Max: 36.5 (27 Dec 2020 19:25)  T(F): 97.7 (27 Dec 2020 19:25), Max: 97.7 (27 Dec 2020 19:25)  HR: 64 (28 Dec 2020 05:12) (64 - 74)  BP: 109/68 (28 Dec 2020 05:12) (100/58 - 109/68)  BP(mean): --  RR: 16 (27 Dec 2020 19:25) (16 - 16)  SpO2: 99% (27 Dec 2020 19:25) (99% - 99%)  Daily     Daily         RECENT LABS:                          12.2   9.05  )-----------( 176      ( 28 Dec 2020 05:30 )             36.8     12-28    142  |  104  |  17  ----------------------------<  122<H>  3.8   |  27  |  0.61    Ca    9.5      28 Dec 2020 05:30    TPro  7.1  /  Alb  3.6  /  TBili  0.4  /  DBili  x   /  AST  28  /  ALT  45  /  AlkPhos  79  12-28    LIVER FUNCTIONS - ( 28 Dec 2020 05:30 )  Alb: 3.6 g/dL / Pro: 7.1 g/dL / ALK PHOS: 79 U/L / ALT: 45 U/L / AST: 28 U/L / GGT: x                   CAPILLARY BLOOD GLUCOSE      POCT Blood Glucose.: 181 mg/dL (28 Dec 2020 12:01)  POCT Blood Glucose.: 148 mg/dL (28 Dec 2020 08:05)  POCT Blood Glucose.: 212 mg/dL (27 Dec 2020 20:12)  POCT Blood Glucose.: 190 mg/dL (27 Dec 2020 16:52)

## 2020-12-29 LAB — SARS-COV-2 RNA SPEC QL NAA+PROBE: SIGNIFICANT CHANGE UP

## 2020-12-29 PROCEDURE — 99232 SBSQ HOSP IP/OBS MODERATE 35: CPT

## 2020-12-29 RX ADMIN — PANTOPRAZOLE SODIUM 40 MILLIGRAM(S): 20 TABLET, DELAYED RELEASE ORAL at 06:26

## 2020-12-29 RX ADMIN — ATORVASTATIN CALCIUM 80 MILLIGRAM(S): 80 TABLET, FILM COATED ORAL at 22:14

## 2020-12-29 RX ADMIN — Medication 1 TABLET(S): at 11:58

## 2020-12-29 RX ADMIN — LISINOPRIL 40 MILLIGRAM(S): 2.5 TABLET ORAL at 06:26

## 2020-12-29 RX ADMIN — ENOXAPARIN SODIUM 40 MILLIGRAM(S): 100 INJECTION SUBCUTANEOUS at 11:58

## 2020-12-29 RX ADMIN — Medication 2: at 17:07

## 2020-12-29 RX ADMIN — Medication 10 UNIT(S): at 17:06

## 2020-12-29 RX ADMIN — INSULIN GLARGINE 40 UNIT(S): 100 INJECTION, SOLUTION SUBCUTANEOUS at 22:15

## 2020-12-29 RX ADMIN — Medication 100 MILLIGRAM(S): at 11:58

## 2020-12-29 RX ADMIN — Medication 81 MILLIGRAM(S): at 11:58

## 2020-12-29 RX ADMIN — Medication 4: at 12:00

## 2020-12-29 RX ADMIN — TICAGRELOR 90 MILLIGRAM(S): 90 TABLET ORAL at 17:07

## 2020-12-29 RX ADMIN — TICAGRELOR 90 MILLIGRAM(S): 90 TABLET ORAL at 06:26

## 2020-12-29 RX ADMIN — Medication 10 UNIT(S): at 08:21

## 2020-12-29 RX ADMIN — Medication 75 MICROGRAM(S): at 06:26

## 2020-12-29 RX ADMIN — SENNA PLUS 2 TABLET(S): 8.6 TABLET ORAL at 22:14

## 2020-12-29 RX ADMIN — Medication 10 UNIT(S): at 11:59

## 2020-12-29 NOTE — PROGRESS NOTE ADULT - SUBJECTIVE AND OBJECTIVE BOX
Patient is a 65y old  Female who presents with a chief complaint of Left Pontine CVA with left sided weakness of upper extremities and bilateral lower extremity weakness  Impairment code: 01.3 Bilateral Involvement (29 Dec 2020 08:01)      HPI:  Patient is a 65 year old female RH-dominant with PMH including HTN, HLD, +LOOP, asthma, hypothyroidism, DM, CVA x 3 (last in 2018) with residual left sided weakness and mild dysarthria, who presented to [facility]/[place] on 12/14/20 with worsening left sided weakness and slurring. Son also reported that the patient fell 3 times on the day of admission; patient herself denied pain. Imaging revealed acute L pontine infarct. Motor strength 3/5 LUE, 4/5 LLE, 5/5 . Intact sensation. Cleared by SLP for soft solid, nectar-thick liquid diet. Patient is on ASA, tigraglecor and atorvastatin.    Hospital Course complicated by UTI on course of PO Cipro 500 q12h. ENT was consulted for noted asymmetry in the nasopharynx on CT; no pathology noted on fiberoptic laryngoscopy. Bilateral TVC movement was noted, with no upper airway obstruction. No ENT surgical intervention recommended. Patient was stabilized and discharged to [facility] for Acute inpatient rehab on 12/21/20. (21 Dec 2020 14:36)      PAST MEDICAL & SURGICAL HISTORY:      MEDICATIONS  (STANDING):  amantadine 100 milliGRAM(s) Oral daily  aspirin enteric coated 81 milliGRAM(s) Oral daily  atorvastatin 80 milliGRAM(s) Oral at bedtime  dextrose 40% Gel 15 Gram(s) Oral once  dextrose 5%. 1000 milliLiter(s) (50 mL/Hr) IV Continuous <Continuous>  dextrose 5%. 1000 milliLiter(s) (100 mL/Hr) IV Continuous <Continuous>  dextrose 50% Injectable 25 Gram(s) IV Push once  dextrose 50% Injectable 12.5 Gram(s) IV Push once  dextrose 50% Injectable 25 Gram(s) IV Push once  enoxaparin Injectable 40 milliGRAM(s) SubCutaneous daily  glucagon  Injectable 1 milliGRAM(s) IntraMuscular once  insulin glargine Injectable (LANTUS) 40 Unit(s) SubCutaneous at bedtime  insulin lispro (ADMELOG) corrective regimen sliding scale   SubCutaneous three times a day before meals  insulin lispro (ADMELOG) corrective regimen sliding scale   SubCutaneous at bedtime  insulin lispro Injectable (ADMELOG) 10 Unit(s) SubCutaneous three times a day before meals  levothyroxine 75 MICROGram(s) Oral daily  lisinopril 40 milliGRAM(s) Oral daily  multivitamin 1 Tablet(s) Oral daily  pantoprazole    Tablet 40 milliGRAM(s) Oral before breakfast  senna 2 Tablet(s) Oral at bedtime  ticagrelor 90 milliGRAM(s) Oral two times a day    MEDICATIONS  (PRN):  acetaminophen   Tablet .. 650 milliGRAM(s) Oral every 6 hours PRN Mild Pain (1 - 3), Moderate Pain (4 - 6)  ALBUTerol    90 MICROgram(s) HFA Inhaler 1 Puff(s) Inhalation every 4 hours PRN Shortness of Breath and/or Wheezing  polyethylene glycol 3350 17 Gram(s) Oral daily PRN Constipation      Allergies    No Known Allergies    Intolerances          VITALS  65y  Vital Signs Last 24 Hrs  T(C): 36.4 (29 Dec 2020 07:46), Max: 36.4 (28 Dec 2020 21:57)  T(F): 97.6 (29 Dec 2020 07:46), Max: 97.6 (28 Dec 2020 21:57)  HR: 77 (29 Dec 2020 07:46) (69 - 78)  BP: 124/80 (29 Dec 2020 07:46) (107/64 - 124/80)  BP(mean): --  RR: 14 (29 Dec 2020 07:46) (14 - 15)  SpO2: 98% (29 Dec 2020 07:46) (97% - 98%)  Daily     Daily         RECENT LABS:                          12.2   9.05  )-----------( 176      ( 28 Dec 2020 05:30 )             36.8     12-28    142  |  104  |  17  ----------------------------<  122<H>  3.8   |  27  |  0.61    Ca    9.5      28 Dec 2020 05:30    TPro  7.1  /  Alb  3.6  /  TBili  0.4  /  DBili  x   /  AST  28  /  ALT  45  /  AlkPhos  79  12-28    LIVER FUNCTIONS - ( 28 Dec 2020 05:30 )  Alb: 3.6 g/dL / Pro: 7.1 g/dL / ALK PHOS: 79 U/L / ALT: 45 U/L / AST: 28 U/L / GGT: x                   CAPILLARY BLOOD GLUCOSE      POCT Blood Glucose.: 246 mg/dL (29 Dec 2020 11:55)  POCT Blood Glucose.: 143 mg/dL (29 Dec 2020 07:42)  POCT Blood Glucose.: 219 mg/dL (28 Dec 2020 21:02)  POCT Blood Glucose.: 185 mg/dL (28 Dec 2020 17:23)        Review of Systems:   · Additional ROS	Patient stable. Became tearful today, concerned about her ability to go home, and expresses lack of support at home Patient multi-lingual and speaks English, but was also conversing with Dr. Robert in Cantonese. She states that her  is not understanding and that she is made to feel guilty about her diagnosis and she feels pressure to take care of grandchildren. She states her daughter is supportive but is an RN who is working for COVID testing for [employer] and can't give the necessary level of support on dc    tearful and distressed during exam    Physical Exam:   · Constitutional	detailed exam  · Constitutional Comments	tearful O x 3  · Respiratory	detailed exam  · Respiratory Details	breath sounds equal; respirations non-labored; clear to auscultation bilaterally  · Cardiovascular	detailed exam  · Cardiovascular Details	regular rate and rhythm  · Gastrointestinal	detailed exam  · GI Normal	soft; nontender; bowel sounds normal  · Extremities	detailed exam  · Extremities Comments	bilateral calves soft, NT no erythema or warmth  left UE 4/5 left HF 3/5 quad 3+/5

## 2020-12-29 NOTE — PROGRESS NOTE ADULT - ASSESSMENT
JEFE CONSTANTINO is a 65y F with a history of HTN, DM, HLD, +LOOP, asthma, hypothyroidism, CVA who presented to [facility]/[place] on 12/14/20 with acute Left Pontine Stroke.    #Acute Pontine Stroke with left upper and bilateral lower extremity weakness, dysarthria, dysphagia, and cognitive impairment  -continue Comprehensive Rehab Program of PT/OT/SLP  3 hours a day for 5 days a week  -neuropsychology f/u requested for supportive counseling 12/29  -SW informed of discussions with patient and lack of support in home  -emotional support and education re: timing of stroke recovery also provided, necessity of continued therapy even on dc and inability to perform tasks such as . We will reach out to family as well  -continue secondary PPX with  ASA 81mg po daily, ticagrelor 90mg po q12h , Atorvastatin 80mg po qhs  -continue amantadine 100 mg daily  -Precautions: cardiac, DM, aspiration, fall, AMS    #HTN  - Lisinopril 40mg po daily   - amlodipine 5mg po daily  -controlled 12/29 (107/64 - 124/80)    #hypothyroidism   - Levothyroxine 75mcg po daily    #DM: uncontrolled. A1C - 7.6 (12/24)  - lantus  30 u qhs   - lispro 10 u qAC   - ISS coverage  -better controlled. Due to hemiparesis, patient is unlikely to be able to self-administer. If her family is unable/unwilling to administer, discuss with hospitalist re: possibility oral meds. Will need diabetic teaching    #Asthma  - albuterol MDI 2 pulls inhalation q4h PRN  -stable    #Skin  - Pressure injury/Skin: OOB to Chair, PT/OT     #Pain Mgmt   - Tylenol PRN    #GI/Bowel Mgmt   -  Senna, Miralax PRN  -? GERD: on protonix 40 mg daily    #/Bladder Mgmt   - bladder scan, PVR per routiune  -s/p Rx UTI with cipro x 3 days.  -optimize hydration, address constipation, mobility    #FEN   -Soft Diet, nectar thick liquids  [CCHO, DASH/TLC]    -CXR 12/23 negative for infiltrate or effusion  -monitor hydration status on nectar-thick liquids  -MBS 12/30    #DVT PPX:   -Lovenox  -SCD TEDs    #For IDT rounds 12/30    #LABs  MBS 12/30  diabetic teaching ?oral meds  psychology  CBC [facility] 12/31

## 2020-12-29 NOTE — PROGRESS NOTE ADULT - SUBJECTIVE AND OBJECTIVE BOX
Patient is a 65y old  Female who presents with a chief complaint of Left Pontine CVA with left sided weakness of upper extremities and bilateral lower extremity weakness  Impairment code: 01.3 Bilateral Involvement (28 Dec 2020 15:47)      Patient seen and examined at bedside.    ALLERGIES:  No Known Allergies    MEDICATIONS  (STANDING):  amantadine 100 milliGRAM(s) Oral daily  aspirin enteric coated 81 milliGRAM(s) Oral daily  atorvastatin 80 milliGRAM(s) Oral at bedtime  dextrose 40% Gel 15 Gram(s) Oral once  dextrose 5%. 1000 milliLiter(s) (50 mL/Hr) IV Continuous <Continuous>  dextrose 5%. 1000 milliLiter(s) (100 mL/Hr) IV Continuous <Continuous>  dextrose 50% Injectable 25 Gram(s) IV Push once  dextrose 50% Injectable 12.5 Gram(s) IV Push once  dextrose 50% Injectable 25 Gram(s) IV Push once  enoxaparin Injectable 40 milliGRAM(s) SubCutaneous daily  glucagon  Injectable 1 milliGRAM(s) IntraMuscular once  insulin glargine Injectable (LANTUS) 40 Unit(s) SubCutaneous at bedtime  insulin lispro (ADMELOG) corrective regimen sliding scale   SubCutaneous at bedtime  insulin lispro (ADMELOG) corrective regimen sliding scale   SubCutaneous three times a day before meals  insulin lispro Injectable (ADMELOG) 10 Unit(s) SubCutaneous three times a day before meals  levothyroxine 75 MICROGram(s) Oral daily  lisinopril 40 milliGRAM(s) Oral daily  multivitamin 1 Tablet(s) Oral daily  pantoprazole    Tablet 40 milliGRAM(s) Oral before breakfast  senna 2 Tablet(s) Oral at bedtime  ticagrelor 90 milliGRAM(s) Oral two times a day    MEDICATIONS  (PRN):  acetaminophen   Tablet .. 650 milliGRAM(s) Oral every 6 hours PRN Mild Pain (1 - 3), Moderate Pain (4 - 6)  ALBUTerol    90 MICROgram(s) HFA Inhaler 1 Puff(s) Inhalation every 4 hours PRN Shortness of Breath and/or Wheezing  polyethylene glycol 3350 17 Gram(s) Oral daily PRN Constipation    Vital Signs Last 24 Hrs  T(F): 97.6 (29 Dec 2020 07:46), Max: 97.9 (28 Dec 2020 08:30)  HR: 77 (29 Dec 2020 07:46) (69 - 78)  BP: 124/80 (29 Dec 2020 07:46) (107/64 - 124/80)  RR: 14 (29 Dec 2020 07:46) (14 - 16)  SpO2: 98% (29 Dec 2020 07:46) (96% - 98%)  I&O's Summary      PHYSICAL EXAM:  General: NAD, A/O x 3  ENT: MMM  Neck: Supple, No JVD  Lungs: Clear to auscultation bilaterally  Cardio: RRR, S1/S2, No murmurs  Abdomen: Soft, Nontender, Nondistended; Bowel sounds present  Extremities: No calf tenderness, No pitting edema    LABS:                        12.2   9.05  )-----------( 176      ( 28 Dec 2020 05:30 )             36.8       12-28    142  |  104  |  17  ----------------------------<  122  3.8   |  27  |  0.61    Ca    9.5      28 Dec 2020 05:30    TPro  7.1  /  Alb  3.6  /  TBili  0.4  /  DBili  x   /  AST  28  /  ALT  45  /  AlkPhos  79  12-28     eGFR if Non African American: 94 mL/min/1.73M2 (12-28-20 @ 05:30)  eGFR if : 109 mL/min/1.73M2 (12-28-20 @ 05:30)                           POCT Blood Glucose.: 143 mg/dL (29 Dec 2020 07:42)  POCT Blood Glucose.: 219 mg/dL (28 Dec 2020 21:02)  POCT Blood Glucose.: 185 mg/dL (28 Dec 2020 17:23)  POCT Blood Glucose.: 181 mg/dL (28 Dec 2020 12:01)  POCT Blood Glucose.: 148 mg/dL (28 Dec 2020 08:05)          Culture - Urine (collected 22 Dec 2020 17:00)  Source: .Urine Clean Catch (Midstream)  Final Report (23 Dec 2020 18:04):    <10,000 CFU/mL Normal Urogenital Arabella        RADIOLOGY & ADDITIONAL TESTS:    Care Discussed with Consultants/Other Providers:

## 2020-12-30 PROCEDURE — 99232 SBSQ HOSP IP/OBS MODERATE 35: CPT

## 2020-12-30 PROCEDURE — 74230 X-RAY XM SWLNG FUNCJ C+: CPT | Mod: 26

## 2020-12-30 RX ORDER — INSULIN LISPRO 100/ML
13 VIAL (ML) SUBCUTANEOUS
Refills: 0 | Status: DISCONTINUED | OUTPATIENT
Start: 2020-12-30 | End: 2020-12-31

## 2020-12-30 RX ADMIN — LISINOPRIL 40 MILLIGRAM(S): 2.5 TABLET ORAL at 05:24

## 2020-12-30 RX ADMIN — Medication 13 UNIT(S): at 07:54

## 2020-12-30 RX ADMIN — PANTOPRAZOLE SODIUM 40 MILLIGRAM(S): 20 TABLET, DELAYED RELEASE ORAL at 05:25

## 2020-12-30 RX ADMIN — Medication 13 UNIT(S): at 12:08

## 2020-12-30 RX ADMIN — TICAGRELOR 90 MILLIGRAM(S): 90 TABLET ORAL at 17:51

## 2020-12-30 RX ADMIN — Medication 100 MILLIGRAM(S): at 12:11

## 2020-12-30 RX ADMIN — Medication 2: at 17:04

## 2020-12-30 RX ADMIN — TICAGRELOR 90 MILLIGRAM(S): 90 TABLET ORAL at 05:24

## 2020-12-30 RX ADMIN — Medication 75 MICROGRAM(S): at 05:24

## 2020-12-30 RX ADMIN — SENNA PLUS 2 TABLET(S): 8.6 TABLET ORAL at 21:30

## 2020-12-30 RX ADMIN — Medication 2: at 07:55

## 2020-12-30 RX ADMIN — Medication 1 TABLET(S): at 12:12

## 2020-12-30 RX ADMIN — Medication 4: at 12:09

## 2020-12-30 RX ADMIN — Medication 13 UNIT(S): at 17:04

## 2020-12-30 RX ADMIN — ATORVASTATIN CALCIUM 80 MILLIGRAM(S): 80 TABLET, FILM COATED ORAL at 21:29

## 2020-12-30 RX ADMIN — INSULIN GLARGINE 40 UNIT(S): 100 INJECTION, SOLUTION SUBCUTANEOUS at 21:30

## 2020-12-30 RX ADMIN — Medication 81 MILLIGRAM(S): at 12:11

## 2020-12-30 RX ADMIN — ENOXAPARIN SODIUM 40 MILLIGRAM(S): 100 INJECTION SUBCUTANEOUS at 12:11

## 2020-12-30 NOTE — PROGRESS NOTE ADULT - SUBJECTIVE AND OBJECTIVE BOX
Patient is a 65y old  Female who presents with a chief complaint of Left Pontine CVA with left sided weakness of upper extremities and bilateral lower extremity weakness  Impairment code: 01.3 Bilateral Involvement (29 Dec 2020 12:46)       Patient seen and examined at bedside. pt reports no complaints. no headache, dizziness, sob, cp.    ALLERGIES:  No Known Allergies    MEDICATIONS  (STANDING):  amantadine 100 milliGRAM(s) Oral daily  aspirin enteric coated 81 milliGRAM(s) Oral daily  atorvastatin 80 milliGRAM(s) Oral at bedtime  dextrose 40% Gel 15 Gram(s) Oral once  dextrose 5%. 1000 milliLiter(s) (50 mL/Hr) IV Continuous <Continuous>  dextrose 5%. 1000 milliLiter(s) (100 mL/Hr) IV Continuous <Continuous>  dextrose 50% Injectable 25 Gram(s) IV Push once  dextrose 50% Injectable 12.5 Gram(s) IV Push once  dextrose 50% Injectable 25 Gram(s) IV Push once  enoxaparin Injectable 40 milliGRAM(s) SubCutaneous daily  glucagon  Injectable 1 milliGRAM(s) IntraMuscular once  insulin glargine Injectable (LANTUS) 40 Unit(s) SubCutaneous at bedtime  insulin lispro (ADMELOG) corrective regimen sliding scale   SubCutaneous three times a day before meals  insulin lispro (ADMELOG) corrective regimen sliding scale   SubCutaneous at bedtime  insulin lispro Injectable (ADMELOG) 13 Unit(s) SubCutaneous three times a day before meals  levothyroxine 75 MICROGram(s) Oral daily  lisinopril 40 milliGRAM(s) Oral daily  multivitamin 1 Tablet(s) Oral daily  pantoprazole    Tablet 40 milliGRAM(s) Oral before breakfast  senna 2 Tablet(s) Oral at bedtime  ticagrelor 90 milliGRAM(s) Oral two times a day    MEDICATIONS  (PRN):  acetaminophen   Tablet .. 650 milliGRAM(s) Oral every 6 hours PRN Mild Pain (1 - 3), Moderate Pain (4 - 6)  ALBUTerol    90 MICROgram(s) HFA Inhaler 1 Puff(s) Inhalation every 4 hours PRN Shortness of Breath and/or Wheezing  polyethylene glycol 3350 17 Gram(s) Oral daily PRN Constipation    Vital Signs Last 24 Hrs  T(F): 98.1 (30 Dec 2020 07:43), Max: 98.3 (29 Dec 2020 19:58)  HR: 69 (30 Dec 2020 07:43) (69 - 81)  BP: 104/69 (30 Dec 2020 07:43) (102/63 - 119/71)  RR: 15 (30 Dec 2020 07:43) (14 - 15)  SpO2: 98% (30 Dec 2020 07:43) (95% - 98%)  I&O's Summary    PHYSICAL EXAM:  General: NAD, A/O x 3  ENT: MMM  Neck: Supple, No JVD  Lungs: Clear to auscultation bilaterally  Cardio: RRR, S1/S2, No murmurs  Abdomen: Soft, Nontender, Nondistended; Bowel sounds present  Extremities: No calf tenderness, No pitting edema  neuro: left hemiparesis    LABS:                        12.2   9.05  )-----------( 176      ( 28 Dec 2020 05:30 )             36.8     12-28    142  |  104  |  17  ----------------------------<  122  3.8   |  27  |  0.61    Ca    9.5      28 Dec 2020 05:30    TPro  7.1  /  Alb  3.6  /  TBili  0.4  /  DBili  x   /  AST  28  /  ALT  45  /  AlkPhos  79  12-28    eGFR if Non African American: 94 mL/min/1.73M2 (12-28-20 @ 05:30)  eGFR if : 109 mL/min/1.73M2 (12-28-20 @ 05:30)            POCT Blood Glucose.: 249 mg/dL (30 Dec 2020 12:02)  POCT Blood Glucose.: 194 mg/dL (30 Dec 2020 07:49)  POCT Blood Glucose.: 221 mg/dL (29 Dec 2020 22:13)  POCT Blood Glucose.: 183 mg/dL (29 Dec 2020 17:06)            RADIOLOGY & ADDITIONAL TESTS:    Care Discussed with Consultants/Other Providers: rehab team

## 2020-12-30 NOTE — PROGRESS NOTE ADULT - ASSESSMENT
JEFE CONSTANTINO is a 65y F with a history of HTN, DM, HLD, +LOOP, asthma, hypothyroidism, CVA who presented to [facility]/[place] on 12/14/20 with acute Left Pontine Stroke.    #Acute Pontine Stroke with left upper and bilateral lower extremity weakness, dysarthria, dysphagia, and cognitive impairment  -continue Comprehensive Rehab Program of PT/OT/SLP  3 hours a day for 5 days a week  -neuropsychology f/u requested for supportive counseling 12/29  -SW informed of discussions with patient and lack of support in home  -emotional support and education re: timing of stroke recovery also provided, necessity of continued therapy even on dc and inability to perform tasks such as . We will reach out to family as well  -continue secondary PPX with  ASA 81mg po daily, ticagrelor 90mg po q12h , Atorvastatin 80mg po qhs  -continue amantadine 100 mg daily  -Precautions: cardiac, DM, aspiration, fall, AMS    #HTN  - Lisinopril 40mg po daily   - amlodipine 5mg po daily  -controlled 12/29 (107/64 - 124/80)    #hypothyroidism   - Levothyroxine 75mcg po daily    #DM: uncontrolled. A1C - 7.6 (12/24)  - lantus  30 u qhs   - lispro 10 u qAC   - ISS coverage  -better controlled. Due to hemiparesis, patient is unlikely to be able to self-administer. If her family is unable/unwilling to administer, discuss with hospitalist re: possibility oral meds. Will need diabetic teaching    #Asthma  - albuterol MDI 2 pulls inhalation q4h PRN  -stable    #Skin  - Pressure injury/Skin: OOB to Chair, PT/OT     #Pain Mgmt   - Tylenol PRN    #GI/Bowel Mgmt   -  Senna, Miralax PRN  -? GERD: on protonix 40 mg daily    #/Bladder Mgmt   - bladder scan, PVR per routiune  -s/p Rx UTI with cipro x 3 days.  -optimize hydration, address constipation, mobility    #FEN   -Soft Diet, nectar thick liquids  [CCHO, DASH/TLC]    -CXR 12/23 negative for infiltrate or effusion  -monitor hydration status on nectar-thick liquids  -MBS 12/30    #DVT PPX:   -Lovenox  -SCD TEDs    #For IDT rounds 12/30    #LABs  MBS 12/30  diabetic teaching ?oral meds  psychology  CBC [facility] 12/31   JEFE CONSTANTINO is a 65y F with a history of HTN, DM, HLD, +LOOP, asthma, hypothyroidism, CVA who presented to [facility]/[place] on 12/14/20 with acute Left Pontine Stroke.    #Acute Pontine Stroke with left upper and bilateral lower extremity weakness, dysarthria, dysphagia, and cognitive impairment  -continue Comprehensive Rehab Program of PT/OT/SLP  3 hours a day for 5 days a week  -neuropsychology f/u requested for supportive counseling 12/29  -SW informed of discussions with patient and lack of support in home  -emotional support and education re: timing of stroke recovery also provided, necessity of continued therapy even on dc and inability to perform tasks such as . We will reach out to family as well  -continue secondary PPX with  ASA 81mg po daily, ticagrelor 90mg po q12h , Atorvastatin 80mg po qhs  -continue amantadine 100 mg daily  -Precautions: cardiac, DM, aspiration, fall, AMS    #HTN  - Lisinopril 40mg po daily   - amlodipine 5mg po daily    #hypothyroidism   - Levothyroxine 75mcg po daily    #DM: uncontrolled. A1C - 7.6 (12/24)  -Not controlled. increase lantus 40unit  -increase lispro to 13u qAC 12/30  - ISS coverage  -continue diabetic education, teaching, medication management    #Asthma  - albuterol MDI 2 pulls inhalation q4h PRN  -stable    #Skin  - Pressure injury/Skin: OOB to Chair, PT/OT     #Pain Mgmt   - Tylenol PRN    #GI/Bowel Mgmt   -  Senna, Miralax PRN  -? GERD: on protonix 40 mg daily    #/Bladder Mgmt   - bladder scan, PVR per routiune  -s/p Rx UTI with cipro x 3 days.  -optimize hydration, address constipation, mobility    #FEN   -MBS 12/30-->upgraded to soft solids and thin liquids , CCD no snacks, low sodium    #DVT PPX:   -Lovenox  -SCD TEDs    #For IDT rounds 12/30:  -CS/CG transfers, ambulates 100 feet RW and CG, negotiates 8 steps with CG. min assist bathing, supervision UB dressing, CG LB dressing. CG toileting and toilet transfers  -goals: mod independent bADLS, household ambulation distances and transfers  -continue diabetic education/injection technique evaluation  -target dc home 1/5/20 with home Pt, OT, SLP and caregiver support    #LABs  diabetic teaching  psychology support  CBC BMP 12/31

## 2020-12-30 NOTE — PROGRESS NOTE ADULT - SUBJECTIVE AND OBJECTIVE BOX
Patient is a 65y old  Female who presents with a chief complaint of Left Pontine CVA with left sided weakness of upper extremities and bilateral lower extremity weakness  Impairment code: 01.3 Bilateral Involvement (30 Dec 2020 11:58)      HPI:  Patient is a 65 year old female RH-dominant with PMH including HTN, HLD, +LOOP, asthma, hypothyroidism, DM, CVA x 3 (last in 2018) with residual left sided weakness and mild dysarthria, who presented to [facility]/[place] on 12/14/20 with worsening left sided weakness and slurring. Son also reported that the patient fell 3 times on the day of admission; patient herself denied pain. Imaging revealed acute L pontine infarct. Motor strength 3/5 LUE, 4/5 LLE, 5/5 . Intact sensation. Cleared by SLP for soft solid, nectar-thick liquid diet. Patient is on ASA, tigraglecor and atorvastatin.    Hospital Course complicated by UTI on course of PO Cipro 500 q12h. ENT was consulted for noted asymmetry in the nasopharynx on CT; no pathology noted on fiberoptic laryngoscopy. Bilateral TVC movement was noted, with no upper airway obstruction. No ENT surgical intervention recommended. Patient was stabilized and discharged to [facility] for Acute inpatient rehab on 12/21/20. (21 Dec 2020 14:36)      PAST MEDICAL & SURGICAL HISTORY:      MEDICATIONS  (STANDING):  amantadine 100 milliGRAM(s) Oral daily  aspirin enteric coated 81 milliGRAM(s) Oral daily  atorvastatin 80 milliGRAM(s) Oral at bedtime  dextrose 40% Gel 15 Gram(s) Oral once  dextrose 5%. 1000 milliLiter(s) (50 mL/Hr) IV Continuous <Continuous>  dextrose 5%. 1000 milliLiter(s) (100 mL/Hr) IV Continuous <Continuous>  dextrose 50% Injectable 25 Gram(s) IV Push once  dextrose 50% Injectable 12.5 Gram(s) IV Push once  dextrose 50% Injectable 25 Gram(s) IV Push once  enoxaparin Injectable 40 milliGRAM(s) SubCutaneous daily  glucagon  Injectable 1 milliGRAM(s) IntraMuscular once  insulin glargine Injectable (LANTUS) 40 Unit(s) SubCutaneous at bedtime  insulin lispro (ADMELOG) corrective regimen sliding scale   SubCutaneous three times a day before meals  insulin lispro (ADMELOG) corrective regimen sliding scale   SubCutaneous at bedtime  insulin lispro Injectable (ADMELOG) 13 Unit(s) SubCutaneous three times a day before meals  levothyroxine 75 MICROGram(s) Oral daily  lisinopril 40 milliGRAM(s) Oral daily  multivitamin 1 Tablet(s) Oral daily  pantoprazole    Tablet 40 milliGRAM(s) Oral before breakfast  senna 2 Tablet(s) Oral at bedtime  ticagrelor 90 milliGRAM(s) Oral two times a day    MEDICATIONS  (PRN):  acetaminophen   Tablet .. 650 milliGRAM(s) Oral every 6 hours PRN Mild Pain (1 - 3), Moderate Pain (4 - 6)  ALBUTerol    90 MICROgram(s) HFA Inhaler 1 Puff(s) Inhalation every 4 hours PRN Shortness of Breath and/or Wheezing  polyethylene glycol 3350 17 Gram(s) Oral daily PRN Constipation      Allergies    No Known Allergies    Intolerances          VITALS  65y  Vital Signs Last 24 Hrs  T(C): 36.7 (30 Dec 2020 07:43), Max: 36.8 (29 Dec 2020 19:58)  T(F): 98.1 (30 Dec 2020 07:43), Max: 98.3 (29 Dec 2020 19:58)  HR: 69 (30 Dec 2020 07:43) (69 - 81)  BP: 104/69 (30 Dec 2020 07:43) (102/63 - 119/71)  BP(mean): --  RR: 15 (30 Dec 2020 07:43) (14 - 15)  SpO2: 98% (30 Dec 2020 07:43) (95% - 98%)  Daily     Daily         RECENT LABS:                      CAPILLARY BLOOD GLUCOSE      POCT Blood Glucose.: 249 mg/dL (30 Dec 2020 12:02)  POCT Blood Glucose.: 194 mg/dL (30 Dec 2020 07:49)  POCT Blood Glucose.: 221 mg/dL (29 Dec 2020 22:13)  POCT Blood Glucose.: 183 mg/dL (29 Dec 2020 17:06)    Review of Systems:   · Additional ROS	Patient stable. Became tearful today, concerned about her ability to go home, and expresses lack of support at home Patient multi-lingual and speaks English, but was also conversing with Dr. Robert in Cantonese. She states that her  is not understanding and that she is made to feel guilty about her diagnosis and she feels pressure to take care of grandchildren. She states her daughter is supportive but is an RN who is working for COVID testing for [employer] and can't give the necessary level of support on dc    tearful and distressed during exam    Physical Exam:   · Constitutional	detailed exam  · Constitutional Comments	tearful O x 3  · Respiratory	detailed exam  · Respiratory Details	breath sounds equal; respirations non-labored; clear to auscultation bilaterally  · Cardiovascular	detailed exam  · Cardiovascular Details	regular rate and rhythm  · Gastrointestinal	detailed exam  · GI Normal	soft; nontender; bowel sounds normal  · Extremities	detailed exam  · Extremities Comments	bilateral calves soft, NT no erythema or warmth  left UE 4/5 left HF 3/5 quad 3+/5             Patient is a 65y old  Female who presents with a chief complaint of Left Pontine CVA with left sided weakness of upper extremities and bilateral lower extremity weakness  Impairment code: 01.3 Bilateral Involvement (30 Dec 2020 11:58)      HPI:  Patient is a 65 year old female RH-dominant with PMH including HTN, HLD, +LOOP, asthma, hypothyroidism, DM, CVA x 3 (last in 2018) with residual left sided weakness and mild dysarthria, who presented to [facility]/[place] on 12/14/20 with worsening left sided weakness and slurring. Son also reported that the patient fell 3 times on the day of admission; patient herself denied pain. Imaging revealed acute L pontine infarct. Motor strength 3/5 LUE, 4/5 LLE, 5/5 . Intact sensation. Cleared by SLP for soft solid, nectar-thick liquid diet. Patient is on ASA, tigraglecor and atorvastatin.    Hospital Course complicated by UTI on course of PO Cipro 500 q12h. ENT was consulted for noted asymmetry in the nasopharynx on CT; no pathology noted on fiberoptic laryngoscopy. Bilateral TVC movement was noted, with no upper airway obstruction. No ENT surgical intervention recommended. Patient was stabilized and discharged to [facility] for Acute inpatient rehab on 12/21/20. (21 Dec 2020 14:36)      PAST MEDICAL & SURGICAL HISTORY:      MEDICATIONS  (STANDING):  amantadine 100 milliGRAM(s) Oral daily  aspirin enteric coated 81 milliGRAM(s) Oral daily  atorvastatin 80 milliGRAM(s) Oral at bedtime  dextrose 40% Gel 15 Gram(s) Oral once  dextrose 5%. 1000 milliLiter(s) (50 mL/Hr) IV Continuous <Continuous>  dextrose 5%. 1000 milliLiter(s) (100 mL/Hr) IV Continuous <Continuous>  dextrose 50% Injectable 25 Gram(s) IV Push once  dextrose 50% Injectable 12.5 Gram(s) IV Push once  dextrose 50% Injectable 25 Gram(s) IV Push once  enoxaparin Injectable 40 milliGRAM(s) SubCutaneous daily  glucagon  Injectable 1 milliGRAM(s) IntraMuscular once  insulin glargine Injectable (LANTUS) 40 Unit(s) SubCutaneous at bedtime  insulin lispro (ADMELOG) corrective regimen sliding scale   SubCutaneous three times a day before meals  insulin lispro (ADMELOG) corrective regimen sliding scale   SubCutaneous at bedtime  insulin lispro Injectable (ADMELOG) 13 Unit(s) SubCutaneous three times a day before meals  levothyroxine 75 MICROGram(s) Oral daily  lisinopril 40 milliGRAM(s) Oral daily  multivitamin 1 Tablet(s) Oral daily  pantoprazole    Tablet 40 milliGRAM(s) Oral before breakfast  senna 2 Tablet(s) Oral at bedtime  ticagrelor 90 milliGRAM(s) Oral two times a day    MEDICATIONS  (PRN):  acetaminophen   Tablet .. 650 milliGRAM(s) Oral every 6 hours PRN Mild Pain (1 - 3), Moderate Pain (4 - 6)  ALBUTerol    90 MICROgram(s) HFA Inhaler 1 Puff(s) Inhalation every 4 hours PRN Shortness of Breath and/or Wheezing  polyethylene glycol 3350 17 Gram(s) Oral daily PRN Constipation      Allergies    No Known Allergies    Intolerances          VITALS  65y  Vital Signs Last 24 Hrs  T(C): 36.7 (30 Dec 2020 07:43), Max: 36.8 (29 Dec 2020 19:58)  T(F): 98.1 (30 Dec 2020 07:43), Max: 98.3 (29 Dec 2020 19:58)  HR: 69 (30 Dec 2020 07:43) (69 - 81)  BP: 104/69 (30 Dec 2020 07:43) (102/63 - 119/71)  BP(mean): --  RR: 15 (30 Dec 2020 07:43) (14 - 15)  SpO2: 98% (30 Dec 2020 07:43) (95% - 98%)  Daily     Daily         RECENT LABS:                      CAPILLARY BLOOD GLUCOSE      POCT Blood Glucose.: 249 mg/dL (30 Dec 2020 12:02)  POCT Blood Glucose.: 194 mg/dL (30 Dec 2020 07:49)  POCT Blood Glucose.: 221 mg/dL (29 Dec 2020 22:13)  POCT Blood Glucose.: 183 mg/dL (29 Dec 2020 17:06)    Review of Systems:   · Additional ROS	Patient mood still dysphoric, blunted affect but not tearful today. States she is doing well, denies H/A, dizziness, pain. denies b/B complaints. performed simulation injection of insulin with nursing, however SLP has concerns given cogntiive impairment and memory re: adhering to medication schedule and proper dosing    Physical Exam:   · Constitutional	detailed exam  · Constitutional Comments	Nad O x 3 grossly  · Respiratory	detailed exam  · Respiratory Details	breath sounds equal; respirations non-labored; clear to auscultation bilaterally  · Cardiovascular	detailed exam  · Cardiovascular Details	regular rate and rhythm  · Gastrointestinal	detailed exam  · GI Normal	soft; nontender; bowel sounds normal  · Extremities	detailed exam  · Extremities Comments	bilateral calves soft, NT no erythema or warmth  left UE 4/5 left HF 3/5 quad 3+/5

## 2020-12-30 NOTE — PROGRESS NOTE ADULT - ASSESSMENT
L pontine stroke  DAPT/Statin  PT/OT/SLP as per rehab    HTN  amlodipine DC'd  Cont lisinopril 40    DM2, a1c 7.5  lantus 40unit  increase lispro to 13u qAC    DVT ppx  Lovenox

## 2020-12-31 LAB
ALBUMIN SERPL ELPH-MCNC: 3.6 G/DL — SIGNIFICANT CHANGE UP (ref 3.3–5)
ALP SERPL-CCNC: 79 U/L — SIGNIFICANT CHANGE UP (ref 40–120)
ALT FLD-CCNC: 46 U/L — HIGH (ref 10–45)
ANION GAP SERPL CALC-SCNC: 15 MMOL/L — SIGNIFICANT CHANGE UP (ref 5–17)
AST SERPL-CCNC: 33 U/L — SIGNIFICANT CHANGE UP (ref 10–40)
BILIRUB SERPL-MCNC: 0.5 MG/DL — SIGNIFICANT CHANGE UP (ref 0.2–1.2)
BUN SERPL-MCNC: 21 MG/DL — SIGNIFICANT CHANGE UP (ref 7–23)
CALCIUM SERPL-MCNC: 9.7 MG/DL — SIGNIFICANT CHANGE UP (ref 8.4–10.5)
CHLORIDE SERPL-SCNC: 105 MMOL/L — SIGNIFICANT CHANGE UP (ref 96–108)
CO2 SERPL-SCNC: 23 MMOL/L — SIGNIFICANT CHANGE UP (ref 22–31)
CREAT SERPL-MCNC: 0.62 MG/DL — SIGNIFICANT CHANGE UP (ref 0.5–1.3)
GLUCOSE SERPL-MCNC: 189 MG/DL — HIGH (ref 70–99)
HCT VFR BLD CALC: 37 % — SIGNIFICANT CHANGE UP (ref 34.5–45)
HGB BLD-MCNC: 12.1 G/DL — SIGNIFICANT CHANGE UP (ref 11.5–15.5)
MCHC RBC-ENTMCNC: 28.2 PG — SIGNIFICANT CHANGE UP (ref 27–34)
MCHC RBC-ENTMCNC: 32.7 GM/DL — SIGNIFICANT CHANGE UP (ref 32–36)
MCV RBC AUTO: 86.2 FL — SIGNIFICANT CHANGE UP (ref 80–100)
NRBC # BLD: 0 /100 WBCS — SIGNIFICANT CHANGE UP (ref 0–0)
PLATELET # BLD AUTO: 156 K/UL — SIGNIFICANT CHANGE UP (ref 150–400)
POTASSIUM SERPL-MCNC: 4.3 MMOL/L — SIGNIFICANT CHANGE UP (ref 3.5–5.3)
POTASSIUM SERPL-SCNC: 4.3 MMOL/L — SIGNIFICANT CHANGE UP (ref 3.5–5.3)
PROT SERPL-MCNC: 7.1 G/DL — SIGNIFICANT CHANGE UP (ref 6–8.3)
RBC # BLD: 4.29 M/UL — SIGNIFICANT CHANGE UP (ref 3.8–5.2)
RBC # FLD: 13.2 % — SIGNIFICANT CHANGE UP (ref 10.3–14.5)
SODIUM SERPL-SCNC: 143 MMOL/L — SIGNIFICANT CHANGE UP (ref 135–145)
WBC # BLD: 9.23 K/UL — SIGNIFICANT CHANGE UP (ref 3.8–10.5)
WBC # FLD AUTO: 9.23 K/UL — SIGNIFICANT CHANGE UP (ref 3.8–10.5)

## 2020-12-31 PROCEDURE — 99232 SBSQ HOSP IP/OBS MODERATE 35: CPT

## 2020-12-31 PROCEDURE — 72050 X-RAY EXAM NECK SPINE 4/5VWS: CPT | Mod: 26

## 2020-12-31 RX ORDER — INSULIN GLARGINE 100 [IU]/ML
45 INJECTION, SOLUTION SUBCUTANEOUS AT BEDTIME
Refills: 0 | Status: DISCONTINUED | OUTPATIENT
Start: 2020-12-31 | End: 2021-01-05

## 2020-12-31 RX ORDER — INSULIN LISPRO 100/ML
16 VIAL (ML) SUBCUTANEOUS
Refills: 0 | Status: DISCONTINUED | OUTPATIENT
Start: 2020-12-31 | End: 2021-01-05

## 2020-12-31 RX ADMIN — TICAGRELOR 90 MILLIGRAM(S): 90 TABLET ORAL at 16:58

## 2020-12-31 RX ADMIN — PANTOPRAZOLE SODIUM 40 MILLIGRAM(S): 20 TABLET, DELAYED RELEASE ORAL at 05:35

## 2020-12-31 RX ADMIN — LISINOPRIL 40 MILLIGRAM(S): 2.5 TABLET ORAL at 05:40

## 2020-12-31 RX ADMIN — Medication 16 UNIT(S): at 16:57

## 2020-12-31 RX ADMIN — Medication 2: at 08:08

## 2020-12-31 RX ADMIN — ATORVASTATIN CALCIUM 80 MILLIGRAM(S): 80 TABLET, FILM COATED ORAL at 21:15

## 2020-12-31 RX ADMIN — Medication 100 MILLIGRAM(S): at 11:47

## 2020-12-31 RX ADMIN — Medication 13 UNIT(S): at 08:07

## 2020-12-31 RX ADMIN — Medication 4: at 11:48

## 2020-12-31 RX ADMIN — INSULIN GLARGINE 45 UNIT(S): 100 INJECTION, SOLUTION SUBCUTANEOUS at 21:29

## 2020-12-31 RX ADMIN — ENOXAPARIN SODIUM 40 MILLIGRAM(S): 100 INJECTION SUBCUTANEOUS at 11:47

## 2020-12-31 RX ADMIN — Medication 1 TABLET(S): at 11:47

## 2020-12-31 RX ADMIN — Medication 13 UNIT(S): at 11:47

## 2020-12-31 RX ADMIN — TICAGRELOR 90 MILLIGRAM(S): 90 TABLET ORAL at 05:41

## 2020-12-31 RX ADMIN — Medication 75 MICROGRAM(S): at 05:41

## 2020-12-31 RX ADMIN — Medication 81 MILLIGRAM(S): at 11:47

## 2020-12-31 NOTE — CHART NOTE - NSCHARTNOTEFT_GEN_A_CORE
Nutrition Follow Up Note  Hospital Course   (Per Electronic Medical Record)    Source:  Patient [X]  Medical Record [X]      Diet:   Consistent Carbohydrate, Low Sodium, Soft (Dysphagia 3-Soft & Bite Sized) Diet w/ Thin Liquids  Liquid Consistency Upgraded on 12/30  Tolerates Diet Consistency Well & Tolerates Fluid Consistency Well   No Chewing/Swallowing Difficulties  No Recent Nausea, Vomiting, Diarrhea or Constipation (as Per Patient)  Consumes % of Meals (as Per Documentation)  on Glucerna 8oz PO BID (Provides 440kcal-20grams of Protein)  Patient Takes Nutrition Supplement   Education Provided on Need for Supplementation     Enteral/Parenteral Nutrition: Not Applicable    Current Weight: 118.3lb on 12/27  Obtain Weights Weekly  Weights Currently Stable @This Time     Pertinent Medications: MEDICATIONS  (STANDING):  amantadine 100 milliGRAM(s) Oral daily  aspirin enteric coated 81 milliGRAM(s) Oral daily  atorvastatin 80 milliGRAM(s) Oral at bedtime  dextrose 40% Gel 15 Gram(s) Oral once  dextrose 5%. 1000 milliLiter(s) (50 mL/Hr) IV Continuous <Continuous>  dextrose 5%. 1000 milliLiter(s) (100 mL/Hr) IV Continuous <Continuous>  dextrose 50% Injectable 25 Gram(s) IV Push once  dextrose 50% Injectable 12.5 Gram(s) IV Push once  dextrose 50% Injectable 25 Gram(s) IV Push once  enoxaparin Injectable 40 milliGRAM(s) SubCutaneous daily  glucagon  Injectable 1 milliGRAM(s) IntraMuscular once  insulin glargine Injectable (LANTUS) 40 Unit(s) SubCutaneous at bedtime  insulin lispro (ADMELOG) corrective regimen sliding scale   SubCutaneous three times a day before meals  insulin lispro (ADMELOG) corrective regimen sliding scale   SubCutaneous at bedtime  insulin lispro Injectable (ADMELOG) 13 Unit(s) SubCutaneous three times a day before meals  levothyroxine 75 MICROGram(s) Oral daily  lisinopril 40 milliGRAM(s) Oral daily  multivitamin 1 Tablet(s) Oral daily  pantoprazole    Tablet 40 milliGRAM(s) Oral before breakfast  senna 2 Tablet(s) Oral at bedtime  ticagrelor 90 milliGRAM(s) Oral two times a day    MEDICATIONS  (PRN):  acetaminophen   Tablet .. 650 milliGRAM(s) Oral every 6 hours PRN Mild Pain (1 - 3), Moderate Pain (4 - 6)  ALBUTerol    90 MICROgram(s) HFA Inhaler 1 Puff(s) Inhalation every 4 hours PRN Shortness of Breath and/or Wheezing  polyethylene glycol 3350 17 Gram(s) Oral daily PRN Constipation    Pertinent Labs:  12-31 Na143 mmol/L Glu 189 mg/dL<H> K+ 4.3 mmol/L Cr  0.62 mg/dL BUN 21 mg/dL 12-31 Alb 3.6 g/dL    Skin: No Pressure Ulcers     Edema: None Noted     Last Bowel Movement: on 12/29    Estimated Needs:   [X] No Change Since Previous Assessment    Previous Nutrition Diagnosis:   Moderate Malnutrition  Chewing/Swallowing Difficulties     Nutrition Diagnosis is [X] Ongoing - Moderate Malnutrition & Chewing Difficulties   Continues on Nutrition Supplement & Soft (Dysphagia 3-Soft & Bite Sized) Diet; Patient Takes Nutrition Supplement & Education Provided on Need for Supplementation     [X] Resolved - Swallowing Difficulties   Liquid Consistency Upgraded to Thin    New Nutrition Diagnosis: [X] Not Applicable    Interventions:   1. Education Provided on Need for Supplementation    2. Recommend Continue Nutrition Plan of Care     Monitoring & Evaluation:   [X] Weights   [X] PO Intake   [X] Skin Integrity   [X] Follow Up (Per Protocol)  [X] Tolerance to Diet Prescription   [X] Other: Labs & POCT    Registered Dietitian/Nutritionist Remains Available.  Wilfrid Be RDN    Pager # [phone]  Phone# [phone]

## 2020-12-31 NOTE — PROGRESS NOTE ADULT - SUBJECTIVE AND OBJECTIVE BOX
HPI:  Patient is a 65 year old female RH-dominant with PMH including HTN, HLD, +LOOP, asthma, hypothyroidism, DM, CVA x 3 (last in 2018) with residual left sided weakness and mild dysarthria, who presented to [facility] on 12/14/20 with worsening left sided weakness and slurring. Son also reported that the patient fell 3 times on the day of admission; patient herself denied pain. Imaging revealed acute L pontine infarct. Motor strength 3/5 LUE, 4/5 LLE, 5/5 . Intact sensation. Cleared by SLP for soft solid, nectar-thick liquid diet. Patient is on ASA, tigraglecor and atorvastatin.    Hospital Course complicated by UTI on course of PO Cipro 500 q12h. ENT was consulted for noted asymmetry in the nasopharynx on CT; no pathology noted on fiberoptic laryngoscopy. Bilateral TVC movement was noted, with no upper airway obstruction. No ENT surgical intervention recommended. Patient was stabilized and discharged to [facility] for Acute inpatient rehab on 12/21/20. (21 Dec 2020 14:36)      INTERVAL HPI/OVERNIGHT EVENTS:  Chart Reviewed and patient seen at bedside.  Patient is aware that her daughter is interested in in-person training for rehab for her.  Patient reports numbness of her left neck that has been present for about a year that she's never addressed.  Otherwise endorses good appetite. +BM  Frustrated with how slowly her leg motor function is recovering.      ROS: Denies fevers, chills, chest pain, shortness of breath, abdominal pain, headaches, nausea/vomiting.      Vital Signs Last 24 Hrs  T(C): 37.1 (31 Dec 2020 07:48), Max: 37.1 (31 Dec 2020 07:48)  T(F): 98.7 (31 Dec 2020 07:48), Max: 98.7 (31 Dec 2020 07:48)  HR: 77 (31 Dec 2020 07:48) (61 - 77)  BP: 129/77 (31 Dec 2020 07:48) (110/65 - 129/77)  BP(mean): --  RR: 14 (31 Dec 2020 07:48) (14 - 14)  SpO2: 97% (31 Dec 2020 07:48) (97% - 99%)    PHYSICAL EXAM:    · Constitutional	detailed exam  · Constitutional Comments	Nad O x 3 grossly  · Respiratory	detailed exam  · Respiratory Details	breath sounds equal; respirations non-labored; clear to auscultation bilaterally  · Cardiovascular	detailed exam  · Cardiovascular Details	regular rate and rhythm  · Gastrointestinal	detailed exam  · GI Normal	soft; nontender; bowel sounds normal  · Extremities	detailed exam  · Extremities Comments	bilateral calves soft, NT no erythema or warmth  left UE 4/5 left HF 3/5 quad 3+/5      LABS:  12-31    143  |  105  |  21  ----------------------------<  189<H>  4.3   |  23  |  0.62    Ca    9.7      31 Dec 2020 05:55    TPro  7.1  /  Alb  3.6  /  TBili  0.5  /  DBili  x   /  AST  33  /  ALT  46<H>  /  AlkPhos  79  12-31                                              12.1   9.23  )-----------( 156      ( 31 Dec 2020 05:55 )             37.0     CAPILLARY BLOOD GLUCOSE      POCT Blood Glucose.: 250 mg/dL (31 Dec 2020 11:47)  POCT Blood Glucose.: 180 mg/dL (31 Dec 2020 07:33)  POCT Blood Glucose.: 200 mg/dL (30 Dec 2020 21:28)  POCT Blood Glucose.: 182 mg/dL (30 Dec 2020 17:01)      MEDICATIONS:  MEDICATIONS  (STANDING):  amantadine 100 milliGRAM(s) Oral daily  aspirin enteric coated 81 milliGRAM(s) Oral daily  atorvastatin 80 milliGRAM(s) Oral at bedtime  dextrose 40% Gel 15 Gram(s) Oral once  dextrose 5%. 1000 milliLiter(s) (50 mL/Hr) IV Continuous <Continuous>  dextrose 5%. 1000 milliLiter(s) (100 mL/Hr) IV Continuous <Continuous>  dextrose 50% Injectable 25 Gram(s) IV Push once  dextrose 50% Injectable 12.5 Gram(s) IV Push once  dextrose 50% Injectable 25 Gram(s) IV Push once  enoxaparin Injectable 40 milliGRAM(s) SubCutaneous daily  glucagon  Injectable 1 milliGRAM(s) IntraMuscular once  insulin glargine Injectable (LANTUS) 40 Unit(s) SubCutaneous at bedtime  insulin lispro (ADMELOG) corrective regimen sliding scale   SubCutaneous three times a day before meals  insulin lispro (ADMELOG) corrective regimen sliding scale   SubCutaneous at bedtime  insulin lispro Injectable (ADMELOG) 13 Unit(s) SubCutaneous three times a day before meals  levothyroxine 75 MICROGram(s) Oral daily  lisinopril 40 milliGRAM(s) Oral daily  multivitamin 1 Tablet(s) Oral daily  pantoprazole    Tablet 40 milliGRAM(s) Oral before breakfast  senna 2 Tablet(s) Oral at bedtime  ticagrelor 90 milliGRAM(s) Oral two times a day    MEDICATIONS  (PRN):  acetaminophen   Tablet .. 650 milliGRAM(s) Oral every 6 hours PRN Mild Pain (1 - 3), Moderate Pain (4 - 6)  ALBUTerol    90 MICROgram(s) HFA Inhaler 1 Puff(s) Inhalation every 4 hours PRN Shortness of Breath and/or Wheezing  polyethylene glycol 3350 17 Gram(s) Oral daily PRN Constipation         HPI:  Patient is a 65 year old female RH-dominant with PMH including HTN, HLD, +LOOP, asthma, hypothyroidism, DM, CVA x 3 (last in 2018) with residual left sided weakness and mild dysarthria, who presented to [facility] on 12/14/20 with worsening left sided weakness and slurring. Son also reported that the patient fell 3 times on the day of admission; patient herself denied pain. Imaging revealed acute L pontine infarct. Motor strength 3/5 LUE, 4/5 LLE, 5/5 . Intact sensation. Cleared by SLP for soft solid, nectar-thick liquid diet. Patient is on ASA, tigraglecor and atorvastatin.    Hospital Course complicated by UTI on course of PO Cipro 500 q12h. ENT was consulted for noted asymmetry in the nasopharynx on CT; no pathology noted on fiberoptic laryngoscopy. Bilateral TVC movement was noted, with no upper airway obstruction. No ENT surgical intervention recommended. Patient was stabilized and discharged to [facility] for Acute inpatient rehab on 12/21/20. (21 Dec 2020 14:36)      INTERVAL HPI/OVERNIGHT EVENTS:  Chart Reviewed and patient seen at bedside.  Patient is aware that her daughter is interested in in-person training for rehab for her.  Patient reports numbness of her left neck that has been present for about a year that she's never addressed. numbness is localized to left lateral neck and down to upper trap, not into arm or fingers, no tingling    Otherwise endorses good appetite. +BM  Frustrated with how slowly her leg motor function is recovering. mood appears fair, less tearful      ROS: Denies fevers, chills, chest pain, shortness of breath, abdominal pain, headaches, nausea/vomiting.      Vital Signs Last 24 Hrs  T(C): 37.1 (31 Dec 2020 07:48), Max: 37.1 (31 Dec 2020 07:48)  T(F): 98.7 (31 Dec 2020 07:48), Max: 98.7 (31 Dec 2020 07:48)  HR: 77 (31 Dec 2020 07:48) (61 - 77)  BP: 129/77 (31 Dec 2020 07:48) (110/65 - 129/77)  BP(mean): --  RR: 14 (31 Dec 2020 07:48) (14 - 14)  SpO2: 97% (31 Dec 2020 07:48) (97% - 99%)    PHYSICAL EXAM:    · Constitutional	detailed exam  · Constitutional Comments	mood fair, blunted affect, no tearfulness today    +tightness in SCM and scalene, mild upper trap tightness. Points to SCM as area of numbness  · Respiratory	detailed exam  · Respiratory Details	breath sounds equal; respirations non-labored; clear to auscultation bilaterally  · Cardiovascular	detailed exam  · Cardiovascular Details	regular rate and rhythm  · Gastrointestinal	detailed exam  · GI Normal	soft; nontender; bowel sounds normal  · Extremities	detailed exam  · Extremities Comments	bilateral calves soft, NT no erythema or warmth  left UE 4/5 left HF 3/5 quad 3+/5      LABS:  12-31    143  |  105  |  21  ----------------------------<  189<H>  4.3   |  23  |  0.62    Ca    9.7      31 Dec 2020 05:55    TPro  7.1  /  Alb  3.6  /  TBili  0.5  /  DBili  x   /  AST  33  /  ALT  46<H>  /  AlkPhos  79  12-31                                              12.1   9.23  )-----------( 156      ( 31 Dec 2020 05:55 )             37.0     CAPILLARY BLOOD GLUCOSE      POCT Blood Glucose.: 250 mg/dL (31 Dec 2020 11:47)  POCT Blood Glucose.: 180 mg/dL (31 Dec 2020 07:33)  POCT Blood Glucose.: 200 mg/dL (30 Dec 2020 21:28)  POCT Blood Glucose.: 182 mg/dL (30 Dec 2020 17:01)      MEDICATIONS:  MEDICATIONS  (STANDING):  amantadine 100 milliGRAM(s) Oral daily  aspirin enteric coated 81 milliGRAM(s) Oral daily  atorvastatin 80 milliGRAM(s) Oral at bedtime  dextrose 40% Gel 15 Gram(s) Oral once  dextrose 5%. 1000 milliLiter(s) (50 mL/Hr) IV Continuous <Continuous>  dextrose 5%. 1000 milliLiter(s) (100 mL/Hr) IV Continuous <Continuous>  dextrose 50% Injectable 25 Gram(s) IV Push once  dextrose 50% Injectable 12.5 Gram(s) IV Push once  dextrose 50% Injectable 25 Gram(s) IV Push once  enoxaparin Injectable 40 milliGRAM(s) SubCutaneous daily  glucagon  Injectable 1 milliGRAM(s) IntraMuscular once  insulin glargine Injectable (LANTUS) 40 Unit(s) SubCutaneous at bedtime  insulin lispro (ADMELOG) corrective regimen sliding scale   SubCutaneous three times a day before meals  insulin lispro (ADMELOG) corrective regimen sliding scale   SubCutaneous at bedtime  insulin lispro Injectable (ADMELOG) 13 Unit(s) SubCutaneous three times a day before meals  levothyroxine 75 MICROGram(s) Oral daily  lisinopril 40 milliGRAM(s) Oral daily  multivitamin 1 Tablet(s) Oral daily  pantoprazole    Tablet 40 milliGRAM(s) Oral before breakfast  senna 2 Tablet(s) Oral at bedtime  ticagrelor 90 milliGRAM(s) Oral two times a day    MEDICATIONS  (PRN):  acetaminophen   Tablet .. 650 milliGRAM(s) Oral every 6 hours PRN Mild Pain (1 - 3), Moderate Pain (4 - 6)  ALBUTerol    90 MICROgram(s) HFA Inhaler 1 Puff(s) Inhalation every 4 hours PRN Shortness of Breath and/or Wheezing  polyethylene glycol 3350 17 Gram(s) Oral daily PRN Constipation

## 2020-12-31 NOTE — PROGRESS NOTE ADULT - SUBJECTIVE AND OBJECTIVE BOX
Patient is a 65y old  Female who presents with a chief complaint of Left Pontine CVA with left sided weakness of upper extremities and bilateral lower extremity weakness  Impairment code: 01.3 Bilateral Involvement (31 Dec 2020 12:11)      Patient seen and examined at bedside. pt has no complaints. no sob, cp, headache, dizziness    ALLERGIES:  No Known Allergies    MEDICATIONS  (STANDING):  amantadine 100 milliGRAM(s) Oral daily  aspirin enteric coated 81 milliGRAM(s) Oral daily  atorvastatin 80 milliGRAM(s) Oral at bedtime  dextrose 40% Gel 15 Gram(s) Oral once  dextrose 5%. 1000 milliLiter(s) (50 mL/Hr) IV Continuous <Continuous>  dextrose 5%. 1000 milliLiter(s) (100 mL/Hr) IV Continuous <Continuous>  dextrose 50% Injectable 25 Gram(s) IV Push once  dextrose 50% Injectable 12.5 Gram(s) IV Push once  dextrose 50% Injectable 25 Gram(s) IV Push once  enoxaparin Injectable 40 milliGRAM(s) SubCutaneous daily  glucagon  Injectable 1 milliGRAM(s) IntraMuscular once  insulin glargine Injectable (LANTUS) 45 Unit(s) SubCutaneous at bedtime  insulin lispro (ADMELOG) corrective regimen sliding scale   SubCutaneous three times a day before meals  insulin lispro (ADMELOG) corrective regimen sliding scale   SubCutaneous at bedtime  insulin lispro Injectable (ADMELOG) 16 Unit(s) SubCutaneous three times a day before meals  levothyroxine 75 MICROGram(s) Oral daily  lisinopril 40 milliGRAM(s) Oral daily  multivitamin 1 Tablet(s) Oral daily  pantoprazole    Tablet 40 milliGRAM(s) Oral before breakfast  senna 2 Tablet(s) Oral at bedtime  ticagrelor 90 milliGRAM(s) Oral two times a day    MEDICATIONS  (PRN):  acetaminophen   Tablet .. 650 milliGRAM(s) Oral every 6 hours PRN Mild Pain (1 - 3), Moderate Pain (4 - 6)  ALBUTerol    90 MICROgram(s) HFA Inhaler 1 Puff(s) Inhalation every 4 hours PRN Shortness of Breath and/or Wheezing  polyethylene glycol 3350 17 Gram(s) Oral daily PRN Constipation    Vital Signs Last 24 Hrs  T(F): 98.7 (31 Dec 2020 07:48), Max: 98.7 (31 Dec 2020 07:48)  HR: 77 (31 Dec 2020 07:48) (61 - 77)  BP: 129/77 (31 Dec 2020 07:48) (110/65 - 129/77)  RR: 14 (31 Dec 2020 07:48) (14 - 14)  SpO2: 97% (31 Dec 2020 07:48) (97% - 99%)  I&O's Summary    30 Dec 2020 07:01  -  31 Dec 2020 07:00  --------------------------------------------------------  IN: 0 mL / OUT: 4 mL / NET: -4 mL      PHYSICAL EXAM:  General: NAD, A/O x 3  ENT: MMM  Neck: Supple, No JVD  Lungs: Clear to auscultation bilaterally  Cardio: RRR, S1/S2  Abdomen: Soft, Nontender, Nondistended; Bowel sounds present  Extremities: No calf tenderness, No pitting edema  neuro: L hemiparesis    LABS:                        12.1   9.23  )-----------( 156      ( 31 Dec 2020 05:55 )             37.0     12-31    143  |  105  |  21  ----------------------------<  189  4.3   |  23  |  0.62    Ca    9.7      31 Dec 2020 05:55    TPro  7.1  /  Alb  3.6  /  TBili  0.5  /  DBili  x   /  AST  33  /  ALT  46  /  AlkPhos  79  12-31    eGFR if Non African American: 94 mL/min/1.73M2 (12-31-20 @ 05:55)  eGFR if African American: 109 mL/min/1.73M2 (12-31-20 @ 05:55)          POCT Blood Glucose.: 250 mg/dL (31 Dec 2020 11:47)  POCT Blood Glucose.: 180 mg/dL (31 Dec 2020 07:33)  POCT Blood Glucose.: 200 mg/dL (30 Dec 2020 21:28)  POCT Blood Glucose.: 182 mg/dL (30 Dec 2020 17:01)            RADIOLOGY & ADDITIONAL TESTS:    Care Discussed with Consultants/Other Providers: rehab team

## 2020-12-31 NOTE — PROGRESS NOTE ADULT - ASSESSMENT
L pontine stroke  DAPT/Statin  PT/OT/SLP as per rehab    HTN  amlodipine DC'd  Cont lisinopril 40    DM2, a1c 7.5  FS still elevated  lantus increase to 45 unit  increase lispro to 16u qAC    DVT ppx  Lovenox

## 2020-12-31 NOTE — PROGRESS NOTE ADULT - ASSESSMENT
65y F with a history of HTN, DM, HLD, +LOOP, asthma, hypothyroidism, CVA who presented to [facility] on 12/14/20 with acute Left Pontine Stroke.    #Acute Pontine Stroke with left upper and bilateral lower extremity weakness, dysarthria, dysphagia, and cognitive impairment  -continue Comprehensive Rehab Program of PT/OT/SLP  3 hours a day for 5 days a week  -neuropsychology f/u requested for supportive counseling 12/29 - will return Monday 1/4  -SW informed of discussions with patient and lack of support in home  -emotional support and education re: timing of stroke recovery also provided, necessity of continued therapy even on dc and inability to perform tasks such as .   - Discussed w/ daughter Lexie Brown who is a RN. She will provide support and take care of her mother when she leaves the hospital. She is interested in in-person training from PT/OT/SLP.  - Discussed w/ LCSW , therapist , and RN during 12/31 morning rounds -- will try to arrange for in-person training for patient's daughter on 1/4/20.  -continue secondary PPX with  ASA 81mg po daily, ticagrelor 90mg po q12h , Atorvastatin 80mg po qhs  -continue amantadine 100 mg daily  -Precautions: cardiac, DM, aspiration, fall, AMS    #HTN  - Lisinopril 40mg po daily   - amlodipine 5mg po daily    #hypothyroidism   - Levothyroxine 75mcg po daily    #DM: uncontrolled. A1C - 7.6 (12/24)  -Not controlled. increased lantus 40unit  -increase lispro to 13u qAC 12/30 -   -will discuss with hospitalist for further adjustment as FS still in 200s  - ISS coverage  -continue diabetic education, teaching, medication management    #Asthma  - albuterol MDI 2 pulls inhalation q4h PRN  -stable    #Skin  - Pressure injury/Skin: OOB to Chair, PT/OT     #Pain Mgmt   - Tylenol PRN    #GI/Bowel Mgmt   -  Senna, Miralax PRN  -? GERD: on protonix 40 mg daily    #/Bladder Mgmt   - bladder scan, PVR per routiune  -s/p Rx UTI with cipro x 3 days.  -optimize hydration, address constipation, mobility    #FEN   -MBS 12/30-->upgraded to soft solids and thin liquids , CCD no snacks, low sodium    #DVT PPX:   -Lovenox  -SCD TEDs    #For IDT rounds 12/30:  -CS/CG transfers, ambulates 100 feet RW and CG, negotiates 8 steps with CG. min assist bathing, supervision UB dressing, CG LB dressing. CG toileting and toilet transfers  -goals: mod independent bADLS, household ambulation distances and transfers  -continue diabetic education/injection technique evaluation  -target dc home 1/5/20 with home Pt, OT, SLP and caregiver support    #LABs  diabetic teaching  psychology support  CBC BMP 1/4 65y F with a history of HTN, DM, HLD, +LOOP, asthma, hypothyroidism, CVA who presented to [facility] on 12/14/20 with acute Left Pontine Stroke.    #Acute Pontine Stroke with left upper and bilateral lower extremity weakness, dysarthria, dysphagia, and cognitive impairment  -continue Comprehensive Rehab Program of PT/OT/SLP  3 hours a day for 5 days a week  -neuropsychology f/u requested for supportive counseling   -SW informed of discussions with patient and lack of support in home  -emotional support and education re: timing of stroke recovery also provided, necessity of continued therapy even on dc and inability to perform tasks such as .   - Discussed w/ daughter Lexie Brown who is a RN. She will provide support and take care of her mother when she leaves the hospital. She is interested in in-person training from PT/OT/SLP. Dsicussed with team, will try to arrange for in-person training for patient's daughter on 1/4/20.  -continue secondary PPX with  ASA 81mg po daily, ticagrelor 90mg po q12h , Atorvastatin 80mg po qhs  -continue amantadine 100 mg daily  -Precautions: cardiac, DM, aspiration, fall, AMS    #left -sided cervical numbness  -area described not in dermatomal distribution and also has SCM/scalene tightness on exam, likely muscular/myofascial  -will order Xray c spine,  -follow up with neurology for further workup as outpatient, discussed with patient    #HTN  - Lisinopril 40mg po daily   - amlodipine 5mg po daily    #hypothyroidism   - Levothyroxine 75mcg po daily    #DM: uncontrolled. A1C - 7.6 (12/24)  -Not controlled. Increased lantus 40unit  -increase lispro to 13u qAC 12/30 -   -will discuss with hospitalist for further adjustment as FS still in 200s  -continue diabetic education, teaching, medication management    #Asthma  - albuterol MDI 2 pulls inhalation q4h PRN  -stable    #Skin  - Pressure injury/Skin: OOB to Chair, PT/OT     #Pain Mgmt   - Tylenol PRN    #GI/Bowel Mgmt   -  Senna, Miralax PRN  -? GERD: on protonix 40 mg daily    #/Bladder Mgmt   - bladder scan, PVR per routiune  -s/p Rx UTI with cipro x 3 days.  -optimize hydration, address constipation, mobility    #FEN   -MBS 12/30-->upgraded to soft solids and thin liquids , CCD no snacks, low sodium    #DVT PPX:   -Lovenox  -SCD TEDs    #Case discussed in IDT rounds 12/30:  -CS/CG transfers, ambulates 100 feet RW and CG, negotiates 8 steps with CG. min assist bathing, supervision UB dressing, CG LB dressing. CG toileting and toilet transfers  -goals: mod independent bADLS, household ambulation distances and transfers  -continue diabetic education/injection technique evaluation  -target dc home 1/5/20 with home Pt, OT, SLP and caregiver support    #LABs  diabetic teaching, monitor FS  C spine Xray  psychology support  CBC BMP 1/4

## 2021-01-01 PROCEDURE — 99232 SBSQ HOSP IP/OBS MODERATE 35: CPT

## 2021-01-01 RX ADMIN — SENNA PLUS 2 TABLET(S): 8.6 TABLET ORAL at 21:04

## 2021-01-01 RX ADMIN — ATORVASTATIN CALCIUM 80 MILLIGRAM(S): 80 TABLET, FILM COATED ORAL at 21:04

## 2021-01-01 RX ADMIN — Medication 16 UNIT(S): at 08:28

## 2021-01-01 RX ADMIN — Medication 16 UNIT(S): at 17:03

## 2021-01-01 RX ADMIN — TICAGRELOR 90 MILLIGRAM(S): 90 TABLET ORAL at 17:05

## 2021-01-01 RX ADMIN — Medication 2: at 08:29

## 2021-01-01 RX ADMIN — Medication 81 MILLIGRAM(S): at 15:14

## 2021-01-01 RX ADMIN — Medication 16 UNIT(S): at 12:11

## 2021-01-01 RX ADMIN — Medication 75 MICROGRAM(S): at 05:11

## 2021-01-01 RX ADMIN — PANTOPRAZOLE SODIUM 40 MILLIGRAM(S): 20 TABLET, DELAYED RELEASE ORAL at 05:11

## 2021-01-01 RX ADMIN — Medication 1 TABLET(S): at 12:14

## 2021-01-01 RX ADMIN — TICAGRELOR 90 MILLIGRAM(S): 90 TABLET ORAL at 05:11

## 2021-01-01 RX ADMIN — LISINOPRIL 40 MILLIGRAM(S): 2.5 TABLET ORAL at 05:11

## 2021-01-01 RX ADMIN — INSULIN GLARGINE 45 UNIT(S): 100 INJECTION, SOLUTION SUBCUTANEOUS at 21:04

## 2021-01-01 RX ADMIN — Medication 100 MILLIGRAM(S): at 15:14

## 2021-01-01 RX ADMIN — Medication 2: at 12:11

## 2021-01-01 RX ADMIN — ENOXAPARIN SODIUM 40 MILLIGRAM(S): 100 INJECTION SUBCUTANEOUS at 15:14

## 2021-01-01 NOTE — PROGRESS NOTE ADULT - SUBJECTIVE AND OBJECTIVE BOX
Patient is a 65y old  Female who presents with a chief complaint of Left Pontine CVA with left sided weakness of upper extremities and bilateral lower extremity weakness/  Bilateral Involvement.    No overnight events noted.  Patient without new/acute symptoms reported.  Patient seen and examined at bedside.    ALLERGIES:  No Known Allergies    MEDICATIONS  (STANDING):  amantadine 100 milliGRAM(s) Oral daily  aspirin enteric coated 81 milliGRAM(s) Oral daily  atorvastatin 80 milliGRAM(s) Oral at bedtime  dextrose 40% Gel 15 Gram(s) Oral once  dextrose 5%. 1000 milliLiter(s) (50 mL/Hr) IV Continuous <Continuous>  dextrose 5%. 1000 milliLiter(s) (100 mL/Hr) IV Continuous <Continuous>  dextrose 50% Injectable 25 Gram(s) IV Push once  dextrose 50% Injectable 12.5 Gram(s) IV Push once  dextrose 50% Injectable 25 Gram(s) IV Push once  enoxaparin Injectable 40 milliGRAM(s) SubCutaneous daily  glucagon  Injectable 1 milliGRAM(s) IntraMuscular once  insulin glargine Injectable (LANTUS) 45 Unit(s) SubCutaneous at bedtime  insulin lispro (ADMELOG) corrective regimen sliding scale   SubCutaneous three times a day before meals  insulin lispro (ADMELOG) corrective regimen sliding scale   SubCutaneous at bedtime  insulin lispro Injectable (ADMELOG) 16 Unit(s) SubCutaneous three times a day before meals  levothyroxine 75 MICROGram(s) Oral daily  lisinopril 40 milliGRAM(s) Oral daily  multivitamin 1 Tablet(s) Oral daily  pantoprazole    Tablet 40 milliGRAM(s) Oral before breakfast  senna 2 Tablet(s) Oral at bedtime  ticagrelor 90 milliGRAM(s) Oral two times a day    MEDICATIONS  (PRN):  acetaminophen   Tablet .. 650 milliGRAM(s) Oral every 6 hours PRN Mild Pain (1 - 3), Moderate Pain (4 - 6)  ALBUTerol    90 MICROgram(s) HFA Inhaler 1 Puff(s) Inhalation every 4 hours PRN Shortness of Breath and/or Wheezing  polyethylene glycol 3350 17 Gram(s) Oral daily PRN Constipation    Vital Signs Last 24 Hrs  T(F): 98.4 (31 Dec 2020 20:45), Max: 98.4 (31 Dec 2020 20:45)  HR: 67 (01 Jan 2021 05:19) (67 - 81)  BP: 112/68 (01 Jan 2021 05:19) (104/67 - 112/68)  RR: 15 (31 Dec 2020 20:45) (15 - 15)  SpO2: 96% (31 Dec 2020 20:45) (96% - 96%)    PHYSICAL EXAM:  General: NAD, A/O x 3  ENT: MMM  Neck: Supple, No JVD  Lungs: Clear to auscultation bilaterally  Cardio: RRR, S1/S2  Abdomen: Soft, Nontender, Nondistended; Bowel sounds present  Extremities: No calf tenderness, No pitting edema  neuro: L hemiparesis    LABS:                        12.1   9.23  )-----------( 156      ( 31 Dec 2020 05:55 )             37.0       12-31    143  |  105  |  21  ----------------------------<  189  4.3   |  23  |  0.62    Ca    9.7      31 Dec 2020 05:55    TPro  7.1  /  Alb  3.6  /  TBili  0.5  /  DBili  x   /  AST  33  /  ALT  46  /  AlkPhos  79  12-31     eGFR if Non African American: 94 mL/min/1.73M2 (12-31-20 @ 05:55)  eGFR if African American: 109 mL/min/1.73M2 (12-31-20 @ 05:55)     POCT Blood Glucose.: 165 mg/dL (01 Jan 2021 08:27)  POCT Blood Glucose.: 185 mg/dL (31 Dec 2020 21:14)  POCT Blood Glucose.: 116 mg/dL (31 Dec 2020 16:56)  POCT Blood Glucose.: 250 mg/dL (31 Dec 2020 11:47)

## 2021-01-01 NOTE — PROGRESS NOTE ADULT - SUBJECTIVE AND OBJECTIVE BOX
Cc: Gait dysfunction    HPI: Patient with no new medical issues today.  Pain controlled, no chest pain, no N/V, no Fevers/Chills. No other new ROS  Has been tolerating rehabilitation program.    acetaminophen   Tablet .. 650 milliGRAM(s) Oral every 6 hours PRN  ALBUTerol    90 MICROgram(s) HFA Inhaler 1 Puff(s) Inhalation every 4 hours PRN  amantadine 100 milliGRAM(s) Oral daily  aspirin enteric coated 81 milliGRAM(s) Oral daily  atorvastatin 80 milliGRAM(s) Oral at bedtime  dextrose 40% Gel 15 Gram(s) Oral once  dextrose 5%. 1000 milliLiter(s) IV Continuous <Continuous>  dextrose 5%. 1000 milliLiter(s) IV Continuous <Continuous>  dextrose 50% Injectable 25 Gram(s) IV Push once  dextrose 50% Injectable 12.5 Gram(s) IV Push once  dextrose 50% Injectable 25 Gram(s) IV Push once  enoxaparin Injectable 40 milliGRAM(s) SubCutaneous daily  glucagon  Injectable 1 milliGRAM(s) IntraMuscular once  insulin glargine Injectable (LANTUS) 45 Unit(s) SubCutaneous at bedtime  insulin lispro (ADMELOG) corrective regimen sliding scale   SubCutaneous three times a day before meals  insulin lispro (ADMELOG) corrective regimen sliding scale   SubCutaneous at bedtime  insulin lispro Injectable (ADMELOG) 16 Unit(s) SubCutaneous three times a day before meals  levothyroxine 75 MICROGram(s) Oral daily  lisinopril 40 milliGRAM(s) Oral daily  multivitamin 1 Tablet(s) Oral daily  pantoprazole    Tablet 40 milliGRAM(s) Oral before breakfast  polyethylene glycol 3350 17 Gram(s) Oral daily PRN  senna 2 Tablet(s) Oral at bedtime  ticagrelor 90 milliGRAM(s) Oral two times a day      T(C): 36.4 (01-01-21 @ 08:58), Max: 36.9 (12-31-20 @ 20:45)  HR: 74 (01-01-21 @ 08:58) (67 - 81)  BP: 116/72 (01-01-21 @ 08:58) (104/67 - 116/72)  RR: 16 (01-01-21 @ 08:58) (15 - 16)  SpO2: 98% (01-01-21 @ 08:58) (96% - 98%)    In NAD  HEENT- EOMI  Heart- RRR, S1S2  Lungs- CTA bl.  Abd- + BS, NT  Ext- No calf pain  Neuro- Exam unchanged          Imp: Patient with diagnosis of left pontine CVA admitted for comprehensive acute rehabilitation.    Plan:  #Acute Pontine Stroke with left upper and bilateral lower extremity weakness, dysarthria, dysphagia, and cognitive impairment  -continue Comprehensive Rehab Program of PT/OT/SLP  3 hours a day for 5 days a week  -neuropsychology f/u requested for supportive counseling   -SW informed of discussions with patient and lack of support in home  -emotional support and education re: timing of stroke recovery also provided, necessity of continued therapy even on dc and inability to perform tasks such as .   - Team Discussed w/ daughter Lexie Brown who is a RN. She will provide support and take care of her mother when she leaves the hospital. She is interested in in-person training from PT/OT/SLP. Dsicussed with team, will try to arrange for in-person training for patient's daughter on 1/4/20.  -continue secondary PPX with  ASA 81mg po daily, ticagrelor 90mg po q12h , Atorvastatin 80mg po qhs  -continue amantadine 100 mg daily  -Precautions: cardiac, DM, aspiration, fall, AMS    #left -sided cervical numbness  -area described not in dermatomal distribution and also has SCM/scalene tightness on exam, likely muscular/myofascial. Patient said these symptoms have been present for a year.   -X-ray C-Spine negative for acute findings  -follow up with neurology for further workup as outpatient, discussed with patient    #HTN  - Lisinopril 40mg po daily   - amlodipine 5mg po daily    #hypothyroidism   - Levothyroxine 75mcg po daily    #DM: uncontrolled. A1C - 7.6 (12/24)  - Medicine recs appreciated  -Not controlled. Increased lantus 45 units 1/1  -increase lispro to 16u qAC 1/1  -continue diabetic education, teaching, medication management    #Asthma  - albuterol MDI 2 pulls inhalation q4h PRN  -stable    #Skin  - Pressure injury/Skin: OOB to Chair, PT/OT     #Pain Mgmt   - Tylenol PRN    #GI/Bowel Mgmt   -  Senna, Miralax PRN  -? GERD: on protonix 40 mg daily    #/Bladder Mgmt   - bladder scan, PVR per routiune  -s/p Rx UTI with cipro x 3 days.  -optimize hydration, address constipation, mobility    #FEN   -MBS 12/30-->upgraded to soft solids and thin liquids , CCD no snacks, low sodium    #DVT PPX:   -Lovenox  -SCD TEDs    #Case discussed in IDT rounds 12/30:  -CS/CG transfers, ambulates 100 feet RW and CG, negotiates 8 steps with CG. min assist bathing, supervision UB dressing, CG LB dressing. CG toileting and toilet transfers  -goals: mod independent bADLS, household ambulation distances and transfers  -continue diabetic education/injection technique evaluation  -target dc home 1/5/20 with home Pt, OT, SLP and caregiver support    #LABs  diabetic teaching, monitor FS  psychology support  CBC BMP 1/4

## 2021-01-01 NOTE — PROGRESS NOTE ADULT - ASSESSMENT
L pontine stroke  DAPT/Statin  PT/OT/SLP as per rehab    HTN, controlled  amlodipine DC'd  Cont lisinopril 40    DM2, a1c 7.5  FS improved  lantus increase to 45 unit  lispro increased to 16u qAC    DVT ppx  Lovenox

## 2021-01-02 PROCEDURE — 99232 SBSQ HOSP IP/OBS MODERATE 35: CPT

## 2021-01-02 RX ADMIN — ATORVASTATIN CALCIUM 80 MILLIGRAM(S): 80 TABLET, FILM COATED ORAL at 21:33

## 2021-01-02 RX ADMIN — SENNA PLUS 2 TABLET(S): 8.6 TABLET ORAL at 21:33

## 2021-01-02 RX ADMIN — TICAGRELOR 90 MILLIGRAM(S): 90 TABLET ORAL at 17:17

## 2021-01-02 RX ADMIN — Medication 100 MILLIGRAM(S): at 11:57

## 2021-01-02 RX ADMIN — Medication 16 UNIT(S): at 11:58

## 2021-01-02 RX ADMIN — Medication 2: at 11:58

## 2021-01-02 RX ADMIN — INSULIN GLARGINE 45 UNIT(S): 100 INJECTION, SOLUTION SUBCUTANEOUS at 21:40

## 2021-01-02 RX ADMIN — TICAGRELOR 90 MILLIGRAM(S): 90 TABLET ORAL at 05:30

## 2021-01-02 RX ADMIN — Medication 2: at 07:46

## 2021-01-02 RX ADMIN — Medication 1 TABLET(S): at 11:57

## 2021-01-02 RX ADMIN — Medication 16 UNIT(S): at 07:45

## 2021-01-02 RX ADMIN — PANTOPRAZOLE SODIUM 40 MILLIGRAM(S): 20 TABLET, DELAYED RELEASE ORAL at 05:30

## 2021-01-02 RX ADMIN — ENOXAPARIN SODIUM 40 MILLIGRAM(S): 100 INJECTION SUBCUTANEOUS at 11:57

## 2021-01-02 RX ADMIN — Medication 16 UNIT(S): at 17:17

## 2021-01-02 RX ADMIN — Medication 81 MILLIGRAM(S): at 11:57

## 2021-01-02 RX ADMIN — Medication 75 MICROGRAM(S): at 05:30

## 2021-01-02 NOTE — PROGRESS NOTE ADULT - SUBJECTIVE AND OBJECTIVE BOX
Cc: Gait dysfunction    HPI: Patient with no new medical issues today.  Pain controlled, no chest pain, no N/V, no Fevers/Chills. No other new ROS  Has been tolerating rehabilitation program.    MEDICATIONS  (STANDING):  amantadine 100 milliGRAM(s) Oral daily  aspirin enteric coated 81 milliGRAM(s) Oral daily  atorvastatin 80 milliGRAM(s) Oral at bedtime  dextrose 40% Gel 15 Gram(s) Oral once  dextrose 5%. 1000 milliLiter(s) (50 mL/Hr) IV Continuous <Continuous>  dextrose 5%. 1000 milliLiter(s) (100 mL/Hr) IV Continuous <Continuous>  dextrose 50% Injectable 25 Gram(s) IV Push once  dextrose 50% Injectable 12.5 Gram(s) IV Push once  dextrose 50% Injectable 25 Gram(s) IV Push once  enoxaparin Injectable 40 milliGRAM(s) SubCutaneous daily  glucagon  Injectable 1 milliGRAM(s) IntraMuscular once  insulin glargine Injectable (LANTUS) 45 Unit(s) SubCutaneous at bedtime  insulin lispro (ADMELOG) corrective regimen sliding scale   SubCutaneous three times a day before meals  insulin lispro (ADMELOG) corrective regimen sliding scale   SubCutaneous at bedtime  insulin lispro Injectable (ADMELOG) 16 Unit(s) SubCutaneous three times a day before meals  levothyroxine 75 MICROGram(s) Oral daily  lisinopril 40 milliGRAM(s) Oral daily  multivitamin 1 Tablet(s) Oral daily  pantoprazole    Tablet 40 milliGRAM(s) Oral before breakfast  senna 2 Tablet(s) Oral at bedtime  ticagrelor 90 milliGRAM(s) Oral two times a day    MEDICATIONS  (PRN):  acetaminophen   Tablet .. 650 milliGRAM(s) Oral every 6 hours PRN Mild Pain (1 - 3), Moderate Pain (4 - 6)  ALBUTerol    90 MICROgram(s) HFA Inhaler 1 Puff(s) Inhalation every 4 hours PRN Shortness of Breath and/or Wheezing  polyethylene glycol 3350 17 Gram(s) Oral daily PRN Constipation      Vital Signs Last 24 Hrs  T(C): 36.8 (02 Jan 2021 08:31), Max: 36.8 (01 Jan 2021 20:47)  T(F): 98.2 (02 Jan 2021 08:31), Max: 98.2 (01 Jan 2021 20:47)  HR: 77 (02 Jan 2021 08:31) (68 - 77)  BP: 108/74 (02 Jan 2021 08:31) (99/61 - 109/69)  BP(mean): --  RR: 15 (02 Jan 2021 08:31) (15 - 17)  SpO2: 97% (02 Jan 2021 08:31) (97% - 97%)    In NAD  HEENT- EOMI  Heart- RRR, S1S2  Lungs- CTA bl.  Abd- + BS, NT  Ext- No calf pain  Neuro- Exam unchanged          Imp: Patient with diagnosis of left pontine CVA admitted for comprehensive acute rehabilitation.    Plan:  #Acute Pontine Stroke with left upper and bilateral lower extremity weakness, dysarthria, dysphagia, and cognitive impairment  -continue Comprehensive Rehab Program of PT/OT/SLP  3 hours a day for 5 days a week  -neuropsychology f/u requested for supportive counseling   -SW informed of discussions with patient and lack of support in home  -emotional support and education re: timing of stroke recovery also provided, necessity of continued therapy even on dc and inability to perform tasks such as .   - Team Discussed w/ daughter Lexie Brown who is a RN. She will provide support and take care of her mother when she leaves the hospital. She is interested in in-person training from PT/OT/SLP. Dsicussed with team, will try to arrange for in-person training for patient's daughter on 1/4/20.  -continue secondary PPX with  ASA 81mg po daily, ticagrelor 90mg po q12h , Atorvastatin 80mg po qhs  -continue amantadine 100 mg daily  -Precautions: cardiac, DM, aspiration, fall, AMS    #left -sided cervical numbness  -area described not in dermatomal distribution and also has SCM/scalene tightness on exam, likely muscular/myofascial. Patient said these symptoms have been present for a year.   -X-ray C-Spine negative for acute findings  -follow up with neurology for further workup as outpatient, discussed with patient    #HTN  - Lisinopril 40mg po daily   - amlodipine 5mg po daily    #hypothyroidism   - Levothyroxine 75mcg po daily    #DM: uncontrolled. A1C - 7.6 (12/24)  - Medicine recs appreciated  -Better controlled 1/2. Increased lantus 45 units 1/1  -increased lispro to 16u qAC 1/1  -continue diabetic education, teaching, medication management    #Asthma  - albuterol MDI 2 pulls inhalation q4h PRN  -stable    #Skin  - Pressure injury/Skin: OOB to Chair, PT/OT     #Pain Mgmt   - Tylenol PRN    #GI/Bowel Mgmt   -  Senna, Miralax PRN  -? GERD: on protonix 40 mg daily    #/Bladder Mgmt   - bladder scan, PVR per routiune  -s/p Rx UTI with cipro x 3 days.  -optimize hydration, address constipation, mobility    #FEN   -MBS 12/30-->upgraded to soft solids and thin liquids , CCD no snacks, low sodium    #DVT PPX:   -Lovenox  -SCD TEDs    #Case discussed in IDT rounds 12/30:  -CS/CG transfers, ambulates 100 feet RW and CG, negotiates 8 steps with CG. min assist bathing, supervision UB dressing, CG LB dressing. CG toileting and toilet transfers  -goals: mod independent bADLS, household ambulation distances and transfers  -continue diabetic education/injection technique evaluation  -target dc home 1/5/20 with home Pt, OT, SLP and caregiver support    #LABs  diabetic teaching, monitor FS  psychology support  CBC BMP 1/4

## 2021-01-02 NOTE — PROGRESS NOTE ADULT - SUBJECTIVE AND OBJECTIVE BOX
Patient is a 65y old  Female who presents with a chief complaint of 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    No overnight events noted.  Patient without new/acute symptoms.  Patient seen and examined at bedside.    ALLERGIES:  No Known Allergies    MEDICATIONS  (STANDING):  amantadine 100 milliGRAM(s) Oral daily  aspirin enteric coated 81 milliGRAM(s) Oral daily  atorvastatin 80 milliGRAM(s) Oral at bedtime  dextrose 40% Gel 15 Gram(s) Oral once  dextrose 5%. 1000 milliLiter(s) (50 mL/Hr) IV Continuous <Continuous>  dextrose 5%. 1000 milliLiter(s) (100 mL/Hr) IV Continuous <Continuous>  dextrose 50% Injectable 25 Gram(s) IV Push once  dextrose 50% Injectable 12.5 Gram(s) IV Push once  dextrose 50% Injectable 25 Gram(s) IV Push once  enoxaparin Injectable 40 milliGRAM(s) SubCutaneous daily  glucagon  Injectable 1 milliGRAM(s) IntraMuscular once  insulin glargine Injectable (LANTUS) 45 Unit(s) SubCutaneous at bedtime  insulin lispro (ADMELOG) corrective regimen sliding scale   SubCutaneous three times a day before meals  insulin lispro (ADMELOG) corrective regimen sliding scale   SubCutaneous at bedtime  insulin lispro Injectable (ADMELOG) 16 Unit(s) SubCutaneous three times a day before meals  levothyroxine 75 MICROGram(s) Oral daily  lisinopril 40 milliGRAM(s) Oral daily  multivitamin 1 Tablet(s) Oral daily  pantoprazole    Tablet 40 milliGRAM(s) Oral before breakfast  senna 2 Tablet(s) Oral at bedtime  ticagrelor 90 milliGRAM(s) Oral two times a day    MEDICATIONS  (PRN):  acetaminophen   Tablet .. 650 milliGRAM(s) Oral every 6 hours PRN Mild Pain (1 - 3), Moderate Pain (4 - 6)  ALBUTerol    90 MICROgram(s) HFA Inhaler 1 Puff(s) Inhalation every 4 hours PRN Shortness of Breath and/or Wheezing  polyethylene glycol 3350 17 Gram(s) Oral daily PRN Constipation    Vital Signs Last 24 Hrs  T(F): 98.2 (02 Jan 2021 08:31), Max: 98.2 (01 Jan 2021 20:47)  HR: 77 (02 Jan 2021 08:31) (68 - 77)  BP: 108/74 (02 Jan 2021 08:31) (99/61 - 109/69)  RR: 15 (02 Jan 2021 08:31) (15 - 17)  SpO2: 97% (02 Jan 2021 08:31) (97% - 97%)    PHYSICAL EXAM:  General: NAD, A/O x 3  ENT: MMM  Neck: Supple, No JVD  Lungs: Clear to auscultation bilaterally  Cardio: RRR, S1/S2  Abdomen: Soft, Nontender, Nondistended; Bowel sounds present  Extremities: No calf tenderness, No pitting edema  neuro: L hemiparesis    LABS:                        12.1   9.23  )-----------( 156      ( 31 Dec 2020 05:55 )             37.0       12-31    143  |  105  |  21  ----------------------------<  189  4.3   |  23  |  0.62    Ca    9.7      31 Dec 2020 05:55    TPro  7.1  /  Alb  3.6  /  TBili  0.5  /  DBili  x   /  AST  33  /  ALT  46  /  AlkPhos  79  12-31     eGFR if Non African American: 94 mL/min/1.73M2 (12-31-20 @ 05:55)  eGFR if African American: 109 mL/min/1.73M2 (12-31-20 @ 05:55)    POCT Blood Glucose.: 191 mg/dL (02 Jan 2021 11:56)  POCT Blood Glucose.: 192 mg/dL (02 Jan 2021 07:45)  POCT Blood Glucose.: 122 mg/dL (01 Jan 2021 20:42)  POCT Blood Glucose.: 125 mg/dL (01 Jan 2021 17:02)  POCT Blood Glucose.: 169 mg/dL (01 Jan 2021 12:10)

## 2021-01-03 PROCEDURE — 99232 SBSQ HOSP IP/OBS MODERATE 35: CPT

## 2021-01-03 RX ADMIN — INSULIN GLARGINE 45 UNIT(S): 100 INJECTION, SOLUTION SUBCUTANEOUS at 21:54

## 2021-01-03 RX ADMIN — PANTOPRAZOLE SODIUM 40 MILLIGRAM(S): 20 TABLET, DELAYED RELEASE ORAL at 05:55

## 2021-01-03 RX ADMIN — Medication 100 MILLIGRAM(S): at 21:51

## 2021-01-03 RX ADMIN — SENNA PLUS 2 TABLET(S): 8.6 TABLET ORAL at 21:51

## 2021-01-03 RX ADMIN — Medication 16 UNIT(S): at 11:51

## 2021-01-03 RX ADMIN — Medication 16 UNIT(S): at 16:54

## 2021-01-03 RX ADMIN — Medication 100 MILLIGRAM(S): at 11:51

## 2021-01-03 RX ADMIN — ATORVASTATIN CALCIUM 80 MILLIGRAM(S): 80 TABLET, FILM COATED ORAL at 21:52

## 2021-01-03 RX ADMIN — Medication 75 MICROGRAM(S): at 05:55

## 2021-01-03 RX ADMIN — TICAGRELOR 90 MILLIGRAM(S): 90 TABLET ORAL at 05:55

## 2021-01-03 RX ADMIN — TICAGRELOR 90 MILLIGRAM(S): 90 TABLET ORAL at 16:54

## 2021-01-03 RX ADMIN — ENOXAPARIN SODIUM 40 MILLIGRAM(S): 100 INJECTION SUBCUTANEOUS at 11:51

## 2021-01-03 RX ADMIN — Medication 2: at 07:51

## 2021-01-03 RX ADMIN — Medication 1 TABLET(S): at 11:51

## 2021-01-03 RX ADMIN — Medication 2: at 11:51

## 2021-01-03 RX ADMIN — LISINOPRIL 40 MILLIGRAM(S): 2.5 TABLET ORAL at 05:55

## 2021-01-03 RX ADMIN — Medication 16 UNIT(S): at 07:50

## 2021-01-03 RX ADMIN — Medication 81 MILLIGRAM(S): at 11:51

## 2021-01-03 NOTE — PROGRESS NOTE ADULT - SUBJECTIVE AND OBJECTIVE BOX
Patient is a 65y old  Female who presents with a chief complaint of 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      No overnight events noted.  Patient without new/acute symptoms reported.  Patient seen and examined at bedside.    ALLERGIES:  No Known Allergies    MEDICATIONS  (STANDING):  amantadine 100 milliGRAM(s) Oral daily  aspirin enteric coated 81 milliGRAM(s) Oral daily  atorvastatin 80 milliGRAM(s) Oral at bedtime  dextrose 40% Gel 15 Gram(s) Oral once  dextrose 5%. 1000 milliLiter(s) (50 mL/Hr) IV Continuous <Continuous>  dextrose 5%. 1000 milliLiter(s) (100 mL/Hr) IV Continuous <Continuous>  dextrose 50% Injectable 12.5 Gram(s) IV Push once  dextrose 50% Injectable 25 Gram(s) IV Push once  dextrose 50% Injectable 25 Gram(s) IV Push once  enoxaparin Injectable 40 milliGRAM(s) SubCutaneous daily  glucagon  Injectable 1 milliGRAM(s) IntraMuscular once  insulin glargine Injectable (LANTUS) 45 Unit(s) SubCutaneous at bedtime  insulin lispro (ADMELOG) corrective regimen sliding scale   SubCutaneous at bedtime  insulin lispro (ADMELOG) corrective regimen sliding scale   SubCutaneous three times a day before meals  insulin lispro Injectable (ADMELOG) 16 Unit(s) SubCutaneous three times a day before meals  levothyroxine 75 MICROGram(s) Oral daily  lisinopril 40 milliGRAM(s) Oral daily  multivitamin 1 Tablet(s) Oral daily  pantoprazole    Tablet 40 milliGRAM(s) Oral before breakfast  senna 2 Tablet(s) Oral at bedtime  ticagrelor 90 milliGRAM(s) Oral two times a day    MEDICATIONS  (PRN):  acetaminophen   Tablet .. 650 milliGRAM(s) Oral every 6 hours PRN Mild Pain (1 - 3), Moderate Pain (4 - 6)  ALBUTerol    90 MICROgram(s) HFA Inhaler 1 Puff(s) Inhalation every 4 hours PRN Shortness of Breath and/or Wheezing  polyethylene glycol 3350 17 Gram(s) Oral daily PRN Constipation    Vital Signs Last 24 Hrs  T(F): 98.4 (03 Jan 2021 07:40), Max: 98.4 (03 Jan 2021 07:40)  HR: 77 (03 Jan 2021 07:40) (73 - 77)  BP: 110/67 (03 Jan 2021 07:40) (101/65 - 112/70)  RR: 14 (03 Jan 2021 07:40) (14 - 14)  SpO2: 98% (03 Jan 2021 07:40) (98% - 98%)    PHYSICAL EXAM:  General: NAD, A/O x 3  ENT: MMM  Neck: Supple, No JVD  Lungs: Clear to auscultation bilaterally  Cardio: RRR, S1/S2  Abdomen: Soft, Nontender, Nondistended; Bowel sounds present  Extremities: No calf tenderness, No pitting edema  Neuro: L hemiparesis    LABS: no new labs generated at the time of this note.        POCT Blood Glucose.: 198 mg/dL (03 Jan 2021 11:50)  POCT Blood Glucose.: 196 mg/dL (03 Jan 2021 07:49)  POCT Blood Glucose.: 168 mg/dL (02 Jan 2021 21:37)  POCT Blood Glucose.: 121 mg/dL (02 Jan 2021 17:16)

## 2021-01-03 NOTE — PROGRESS NOTE ADULT - SUBJECTIVE AND OBJECTIVE BOX
Cc: Gait dysfunction    HPI: Patient with no new medical issues today.  Pain controlled, no chest pain, no N/V, no Fevers/Chills. No other new ROS  Has been tolerating rehabilitation program.    MEDICATIONS  (STANDING):  amantadine 100 milliGRAM(s) Oral daily  aspirin enteric coated 81 milliGRAM(s) Oral daily  atorvastatin 80 milliGRAM(s) Oral at bedtime  dextrose 40% Gel 15 Gram(s) Oral once  dextrose 5%. 1000 milliLiter(s) (50 mL/Hr) IV Continuous <Continuous>  dextrose 5%. 1000 milliLiter(s) (100 mL/Hr) IV Continuous <Continuous>  dextrose 50% Injectable 25 Gram(s) IV Push once  dextrose 50% Injectable 12.5 Gram(s) IV Push once  dextrose 50% Injectable 25 Gram(s) IV Push once  enoxaparin Injectable 40 milliGRAM(s) SubCutaneous daily  glucagon  Injectable 1 milliGRAM(s) IntraMuscular once  insulin glargine Injectable (LANTUS) 45 Unit(s) SubCutaneous at bedtime  insulin lispro (ADMELOG) corrective regimen sliding scale   SubCutaneous at bedtime  insulin lispro (ADMELOG) corrective regimen sliding scale   SubCutaneous three times a day before meals  insulin lispro Injectable (ADMELOG) 16 Unit(s) SubCutaneous three times a day before meals  levothyroxine 75 MICROGram(s) Oral daily  lisinopril 40 milliGRAM(s) Oral daily  multivitamin 1 Tablet(s) Oral daily  pantoprazole    Tablet 40 milliGRAM(s) Oral before breakfast  senna 2 Tablet(s) Oral at bedtime  ticagrelor 90 milliGRAM(s) Oral two times a day    MEDICATIONS  (PRN):  acetaminophen   Tablet .. 650 milliGRAM(s) Oral every 6 hours PRN Mild Pain (1 - 3), Moderate Pain (4 - 6)  ALBUTerol    90 MICROgram(s) HFA Inhaler 1 Puff(s) Inhalation every 4 hours PRN Shortness of Breath and/or Wheezing  polyethylene glycol 3350 17 Gram(s) Oral daily PRN Constipation      Vital Signs Last 24 Hrs  T(C): 36.9 (03 Jan 2021 07:40), Max: 36.9 (03 Jan 2021 07:40)  T(F): 98.4 (03 Jan 2021 07:40), Max: 98.4 (03 Jan 2021 07:40)  HR: 77 (03 Jan 2021 07:40) (73 - 77)  BP: 110/67 (03 Jan 2021 07:40) (101/65 - 112/70)  BP(mean): --  RR: 14 (03 Jan 2021 07:40) (14 - 14)  SpO2: 98% (03 Jan 2021 07:40) (98% - 98%)    In NAD  HEENT- EOMI  Heart- RRR, S1S2  Lungs- CTA bl.  Abd- + BS, NT  Ext- No calf pain  Neuro- Exam unchanged          Imp: Patient with diagnosis of left pontine CVA admitted for comprehensive acute rehabilitation.    Plan:  #Acute Pontine Stroke with left upper and bilateral lower extremity weakness, dysarthria, dysphagia, and cognitive impairment  -continue Comprehensive Rehab Program of PT/OT/SLP  3 hours a day for 5 days a week  -neuropsychology f/u requested for supportive counseling   -SW informed of discussions with patient and lack of support in home  -emotional support and education re: timing of stroke recovery also provided, necessity of continued therapy even on dc and inability to perform tasks such as .   - Team Discussed w/ daughter Lexie Brown who is a RN. She will provide support and take care of her mother when she leaves the hospital. She is interested in in-person training from PT/OT/SLP. Dsicussed with team, will try to arrange for in-person training for patient's daughter on 1/4/20.  -continue secondary PPX with  ASA 81mg po daily, ticagrelor 90mg po q12h , Atorvastatin 80mg po qhs  -continue amantadine 100 mg daily  -Precautions: cardiac, DM, aspiration, fall, AMS    #left -sided cervical numbness  -area described not in dermatomal distribution and also has SCM/scalene tightness on exam, likely muscular/myofascial. Patient said these symptoms have been present for a year.   -X-ray C-Spine negative for acute findings  -follow up with neurology for further workup as outpatient, discussed with patient    #HTN  - Lisinopril 40mg po daily   - amlodipine 5mg po daily    #hypothyroidism   - Levothyroxine 75mcg po daily    #DM: uncontrolled. A1C - 7.6 (12/24)  - Medicine recs appreciated  -Better controlled. Increased lantus 45 units 1/1  -increased lispro to 16u qAC 1/1  -continue diabetic education, teaching, medication management    #Asthma  - albuterol MDI 2 pulls inhalation q4h PRN  -stable    #Skin  - Pressure injury/Skin: OOB to Chair, PT/OT     #Pain Mgmt   - Tylenol PRN    #GI/Bowel Mgmt   -  Senna, Miralax PRN  -? GERD: on protonix 40 mg daily    #/Bladder Mgmt   - bladder scan, PVR per routiune  -s/p Rx UTI with cipro x 3 days.  -optimize hydration, address constipation, mobility    #FEN   -MBS 12/30-->upgraded to soft solids and thin liquids , CCD no snacks, low sodium    #DVT PPX:   -Lovenox  -SCD TEDs    #Case discussed in IDT rounds 12/30:  -CS/CG transfers, ambulates 100 feet RW and CG, negotiates 8 steps with CG. min assist bathing, supervision UB dressing, CG LB dressing. CG toileting and toilet transfers  -goals: mod independent bADLS, household ambulation distances and transfers  -continue diabetic education/injection technique evaluation  -target dc home 1/5/20 with home Pt, OT, SLP and caregiver support    #LABs  diabetic teaching, monitor FS  psychology support  CBC BMP 1/4

## 2021-01-03 NOTE — PROGRESS NOTE ADULT - ASSESSMENT
L pontine stroke  DAPT/Statin  PT/OT/SLP as per rehab    HTN, controlled  amlodipine DC'd  Cont lisinopril 40    DM2, a1c 7.5  FS improved  lantus increased to 45 unit 12/31  lispro increased to 16u qAC 12/31    DVT ppx  Lovenox

## 2021-01-04 ENCOUNTER — TRANSCRIPTION ENCOUNTER (OUTPATIENT)
Age: 66
End: 2021-01-04

## 2021-01-04 LAB
ALBUMIN SERPL ELPH-MCNC: 3.9 G/DL — SIGNIFICANT CHANGE UP (ref 3.3–5)
ALP SERPL-CCNC: 89 U/L — SIGNIFICANT CHANGE UP (ref 40–120)
ALT FLD-CCNC: 48 U/L — HIGH (ref 10–45)
ANION GAP SERPL CALC-SCNC: 11 MMOL/L — SIGNIFICANT CHANGE UP (ref 5–17)
AST SERPL-CCNC: 23 U/L — SIGNIFICANT CHANGE UP (ref 10–40)
BILIRUB SERPL-MCNC: 0.5 MG/DL — SIGNIFICANT CHANGE UP (ref 0.2–1.2)
BUN SERPL-MCNC: 23 MG/DL — SIGNIFICANT CHANGE UP (ref 7–23)
CALCIUM SERPL-MCNC: 9.6 MG/DL — SIGNIFICANT CHANGE UP (ref 8.4–10.5)
CHLORIDE SERPL-SCNC: 104 MMOL/L — SIGNIFICANT CHANGE UP (ref 96–108)
CO2 SERPL-SCNC: 27 MMOL/L — SIGNIFICANT CHANGE UP (ref 22–31)
CREAT SERPL-MCNC: 0.93 MG/DL — SIGNIFICANT CHANGE UP (ref 0.5–1.3)
GLUCOSE SERPL-MCNC: 197 MG/DL — HIGH (ref 70–99)
HCT VFR BLD CALC: 39.1 % — SIGNIFICANT CHANGE UP (ref 34.5–45)
HGB BLD-MCNC: 13 G/DL — SIGNIFICANT CHANGE UP (ref 11.5–15.5)
MCHC RBC-ENTMCNC: 29.3 PG — SIGNIFICANT CHANGE UP (ref 27–34)
MCHC RBC-ENTMCNC: 33.2 GM/DL — SIGNIFICANT CHANGE UP (ref 32–36)
MCV RBC AUTO: 88.1 FL — SIGNIFICANT CHANGE UP (ref 80–100)
NRBC # BLD: 0 /100 WBCS — SIGNIFICANT CHANGE UP (ref 0–0)
PLATELET # BLD AUTO: 187 K/UL — SIGNIFICANT CHANGE UP (ref 150–400)
POTASSIUM SERPL-MCNC: 3.9 MMOL/L — SIGNIFICANT CHANGE UP (ref 3.5–5.3)
POTASSIUM SERPL-SCNC: 3.9 MMOL/L — SIGNIFICANT CHANGE UP (ref 3.5–5.3)
PROT SERPL-MCNC: 7.5 G/DL — SIGNIFICANT CHANGE UP (ref 6–8.3)
RBC # BLD: 4.44 M/UL — SIGNIFICANT CHANGE UP (ref 3.8–5.2)
RBC # FLD: 13 % — SIGNIFICANT CHANGE UP (ref 10.3–14.5)
SODIUM SERPL-SCNC: 142 MMOL/L — SIGNIFICANT CHANGE UP (ref 135–145)
WBC # BLD: 9.52 K/UL — SIGNIFICANT CHANGE UP (ref 3.8–10.5)
WBC # FLD AUTO: 9.52 K/UL — SIGNIFICANT CHANGE UP (ref 3.8–10.5)

## 2021-01-04 PROCEDURE — 99233 SBSQ HOSP IP/OBS HIGH 50: CPT

## 2021-01-04 PROCEDURE — 99232 SBSQ HOSP IP/OBS MODERATE 35: CPT

## 2021-01-04 RX ADMIN — Medication 16 UNIT(S): at 17:02

## 2021-01-04 RX ADMIN — SENNA PLUS 2 TABLET(S): 8.6 TABLET ORAL at 22:19

## 2021-01-04 RX ADMIN — Medication 75 MICROGRAM(S): at 06:03

## 2021-01-04 RX ADMIN — Medication 100 MILLIGRAM(S): at 11:40

## 2021-01-04 RX ADMIN — Medication 16 UNIT(S): at 11:41

## 2021-01-04 RX ADMIN — LISINOPRIL 40 MILLIGRAM(S): 2.5 TABLET ORAL at 06:03

## 2021-01-04 RX ADMIN — Medication 2: at 11:41

## 2021-01-04 RX ADMIN — Medication 16 UNIT(S): at 07:59

## 2021-01-04 RX ADMIN — TICAGRELOR 90 MILLIGRAM(S): 90 TABLET ORAL at 17:02

## 2021-01-04 RX ADMIN — PANTOPRAZOLE SODIUM 40 MILLIGRAM(S): 20 TABLET, DELAYED RELEASE ORAL at 06:03

## 2021-01-04 RX ADMIN — Medication 1 TABLET(S): at 11:36

## 2021-01-04 RX ADMIN — Medication 2: at 07:58

## 2021-01-04 RX ADMIN — Medication 100 MILLIGRAM(S): at 06:03

## 2021-01-04 RX ADMIN — INSULIN GLARGINE 45 UNIT(S): 100 INJECTION, SOLUTION SUBCUTANEOUS at 22:01

## 2021-01-04 RX ADMIN — Medication 100 MILLIGRAM(S): at 11:36

## 2021-01-04 RX ADMIN — ATORVASTATIN CALCIUM 80 MILLIGRAM(S): 80 TABLET, FILM COATED ORAL at 22:19

## 2021-01-04 RX ADMIN — ENOXAPARIN SODIUM 40 MILLIGRAM(S): 100 INJECTION SUBCUTANEOUS at 11:36

## 2021-01-04 RX ADMIN — Medication 81 MILLIGRAM(S): at 11:36

## 2021-01-04 RX ADMIN — TICAGRELOR 90 MILLIGRAM(S): 90 TABLET ORAL at 06:03

## 2021-01-04 NOTE — PROGRESS NOTE ADULT - GASTROINTESTINAL DETAILS
soft/nontender/bowel sounds normal

## 2021-01-04 NOTE — DISCHARGE NOTE PROVIDER - PROVIDER TOKENS
FREE:[LAST:[neurologist],FIRST:[[place]],PHONE:[(   )    -],FAX:[(   )    -]],FREE:[LAST:[Endocrinologist],FIRST:[Outside],PHONE:[(   )    -],FAX:[(   )    -]] FREE:[LAST:[neurologist],FIRST:[[place]],PHONE:[(   )    -],FAX:[(   )    -]],FREE:[LAST:[Endocrinologist],FIRST:[Outside],PHONE:[(   )    -],FAX:[(   )    -]],PROVIDER:[TOKEN:[[id number]],FOLLOWUP:[1 month]]

## 2021-01-04 NOTE — DISCHARGE NOTE PROVIDER - NSDCMRMEDTOKEN_GEN_ALL_CORE_FT
amantadine 100 mg oral capsule: 1 cap(s) orally once a day  aspirin 81 mg oral delayed release tablet: 1 tab(s) orally once a day  Brilinta (ticagrelor) 90 mg oral tablet: 1 tab(s) orally 2 times a day  levothyroxine 75 mcg (0.075 mg) oral tablet: 1 tab(s) orally once a day  Lipitor 80 mg oral tablet: 1 tab(s) orally once a day (at bedtime)  Multiple Vitamins oral tablet: 1 tab(s) orally once a day  ramipril 10 mg oral capsule: 1 cap(s) orally once a day  Tresiba FlexTouch 200 units/mL subcutaneous solution: 22 unit(s) subcutaneous once a day (at bedtime)

## 2021-01-04 NOTE — DISCHARGE NOTE PROVIDER - DETAILS OF MALNUTRITION DIAGNOSIS/DIAGNOSES
This patient has been assessed with a concern for Malnutrition and was treated during this hospitalization for the following Nutrition diagnosis/diagnoses:     -  12/22/2020: Moderate protein-calorie malnutrition   This patient has been assessed with a concern for Malnutrition and was treated during this hospitalization for the following Nutrition diagnosis/diagnoses:     -  12/22/2020: Moderate protein-calorie malnutrition    This patient has been assessed with a concern for Malnutrition and was treated during this hospitalization for the following Nutrition diagnosis/diagnoses:     -  12/22/2020: Moderate protein-calorie malnutrition   This patient has been assessed with a concern for Malnutrition and was treated during this hospitalization for the following Nutrition diagnosis/diagnoses:     -  12/22/2020: Moderate protein-calorie malnutrition    This patient has been assessed with a concern for Malnutrition and was treated during this hospitalization for the following Nutrition diagnosis/diagnoses:     -  12/22/2020: Moderate protein-calorie malnutrition    This patient has been assessed with a concern for Malnutrition and was treated during this hospitalization for the following Nutrition diagnosis/diagnoses:     -  12/22/2020: Moderate protein-calorie malnutrition   This patient has been assessed with a concern for Malnutrition and was treated during this hospitalization for the following Nutrition diagnosis/diagnoses:     -  12/22/2020: Moderate protein-calorie malnutrition    This patient has been assessed with a concern for Malnutrition and was treated during this hospitalization for the following Nutrition diagnosis/diagnoses:     -  12/22/2020: Moderate protein-calorie malnutrition    This patient has been assessed with a concern for Malnutrition and was treated during this hospitalization for the following Nutrition diagnosis/diagnoses:     -  12/22/2020: Moderate protein-calorie malnutrition    This patient has been assessed with a concern for Malnutrition and was treated during this hospitalization for the following Nutrition diagnosis/diagnoses:     -  12/22/2020: Moderate protein-calorie malnutrition

## 2021-01-04 NOTE — PROGRESS NOTE ADULT - COMMENTS
Patient seen with Dr Robert, translating in Cantonese. Patient reports that she feels anxiety overall, which may make her more aware of somatic issues such as numbness in the left side of her neck (not changed in quality and pre-hospitalization). She feels she needs to care for her grandchildren; support and importance of self care and continuation of her therapies, as well as our discussions with family re: availability of other sources for , reviewed
Patient seen with lunch, noted to be coughing. Checked oral cavity=--no residue or pocketing. clean, no thrush. no wheezing, but reports coughing with liquids. will ask SLP to re-eval. CXR ordered, no new infiltrate    denies H/A. alexa fair, continues to have uncontrolled hyperglycemia despite insulin adjustments
Patient stable over weekend. FS better controlled although still elevated 148-212. no complaints of pain or H/A. no SOb or signficiant cough

## 2021-01-04 NOTE — PROGRESS NOTE ADULT - ASSESSMENT
65y F with a history of HTN, DM, HLD, +LOOP, asthma, hypothyroidism, CVA who presented to [facility]/[place] on 12/14/20 with acute Left Pontine Stroke.    #Acute Pontine Stroke with left upper and bilateral lower extremity weakness, dysarthria, dysphagia, and cognitive impairment  -continue Comprehensive Rehab Program of PT/OT/SLP  3 hours a day for 5 days a week  -SW informed of discussions with patient and lack of support in home  -emotional support and education re: timing of stroke recovery also provided, necessity of continued therapy even on dc and inability to perform tasks such as . Psychology supportive counseling  - family training today 1/4  -continue secondary PPX with  ASA 81mg po daily, ticagrelor 90mg po q12h , Atorvastatin 80mg po qhs  -continue amantadine 100 mg daily  -Precautions: cardiac, DM, aspiration, fall, AMS    #left -sided cervical numbness  -area described not in dermatomal distribution and also has SCM/scalene tightness on exam, likely muscular/myofascial  -C spine Xray:  Mild bilateral facet arthrosis. No acute fracture or subluxation. Reviewed with patient, outpatient neuro f/u  -follow up with neurology for further workup as outpatient, discussed with patient    #HTN  - Lisinopril 40mg po daily   - amlodipine 5mg po daily    #hypothyroidism   - Levothyroxine 75mcg po daily    #DM: uncontrolled. A1C - 7.6 (12/24)  -Better controlled. Increased lantus 45 units 1/1  -increased lispro to 16u qAC 1/1  -continue diabetic education, teaching, medication management    #Asthma  - albuterol MDI 2 pulls inhalation q4h PRN    #Pain Mgmt   - Tylenol PRN    #GI/Bowel Mgmt   -  Senna, Miralax PRN  -GERD: on protonix 40 mg daily    #FEN   - soft solids and thin liquids , CCD no snacks, low sodium    #DVT PPX:   -Lovenox  -SCD TEDs    #Case discussed in IDT rounds 12/30:  -CS/CG transfers, ambulates 100 feet RW and CG, negotiates 8 steps with CG. min assist bathing, supervision UB dressing, CG LB dressing. CG toileting and toilet transfers  -goals: mod independent bADLS, household ambulation distances and transfers  -continue diabetic education/injection technique evaluation  -caregiver training 1/4  -target dc home 1/5/20 with home Pt, OT, SLP and caregiver support    #LABs  psychology support

## 2021-01-04 NOTE — PROGRESS NOTE ADULT - SUBJECTIVE AND OBJECTIVE BOX
65y old  Female s/p Left Pontine CVA    seen at the bedside, No overnight events noted.  no n/v, no sob, tolerating po diet, and PT.    Vital Signs Last 24 Hrs  T(C): 36.8 (04 Jan 2021 07:53), Max: 36.8 (04 Jan 2021 07:53)  T(F): 98.2 (04 Jan 2021 07:53), Max: 98.2 (04 Jan 2021 07:53)  HR: 68 (04 Jan 2021 07:53) (68 - 68)  BP: 128/76 (04 Jan 2021 07:53) (111/65 - 128/76)  BP(mean): --  RR: 14 (04 Jan 2021 07:53) (14 - 15)  SpO2: 99% (04 Jan 2021 07:53) (96% - 99%)    GENERAL- NAD  EAR/NOSE/MOUTH/THROAT - no pharyngeal exudates, no oral leisions,  MMM  EYES- KELLY, conjunctiva and Sclera clear  NECK- supple  RESPIRATORY-  clear to auscultation bilaterally  CARDIOVASCULAR - SIS2, RRR  GI - soft NT BS present  EXTREMITIES- no pedal edema  NEUROLOGY- left weakness, improved  SKIN- no rashes, warm to touch  PSYCHIATRY- AAO X 3      CAPILLARY BLOOD GLUCOSE      POCT Blood Glucose.: 191 mg/dL (04 Jan 2021 07:27)  POCT Blood Glucose.: 195 mg/dL (03 Jan 2021 21:50)  POCT Blood Glucose.: 145 mg/dL (03 Jan 2021 16:52)  POCT Blood Glucose.: 198 mg/dL (03 Jan 2021 11:50)    A1C with Estimated Average Glucose Result: 7.% (12.24.20 @ 06:59)    MEDICATIONS  (STANDING):  amantadine 100 milliGRAM(s) Oral daily  aspirin enteric coated 81 milliGRAM(s) Oral daily  atorvastatin 80 milliGRAM(s) Oral at bedtime  enoxaparin Injectable 40 milliGRAM(s) SubCutaneous daily  glucagon  Injectable 1 milliGRAM(s) IntraMuscular once  insulin glargine Injectable (LANTUS) 45 Unit(s) SubCutaneous at bedtime  insulin lispro (ADMELOG) corrective regimen sliding scale   SubCutaneous at bedtime  insulin lispro (ADMELOG) corrective regimen sliding scale   SubCutaneous three times a day before meals  insulin lispro Injectable (ADMELOG) 16 Unit(s) SubCutaneous three times a day before meals  levothyroxine 75 MICROGram(s) Oral daily  lisinopril 40 milliGRAM(s) Oral daily  multivitamin 1 Tablet(s) Oral daily  pantoprazole    Tablet 40 milliGRAM(s) Oral before breakfast  senna 2 Tablet(s) Oral at bedtime  ticagrelor 90 milliGRAM(s) Oral two times a day    MEDICATIONS  (PRN):  acetaminophen   Tablet .. 650 milliGRAM(s) Oral every 6 hours PRN Mild Pain (1 - 3), Moderate Pain (4 - 6)  ALBUTerol    90 MICROgram(s) HFA Inhaler 1 Puff(s) Inhalation every 4 hours PRN Shortness of Breath and/or Wheezing  guaiFENesin   Syrup  (Sugar-Free) 100 milliGRAM(s) Oral every 6 hours PRN Cough  polyethylene glycol 3350 17 Gram(s) Oral daily PRN Constipation

## 2021-01-04 NOTE — CHART NOTE - NSCHARTNOTEFT_GEN_A_CORE
Pt's roommate tested positive for COVID today.  Results discussed with patient and her family.  Patient placed on droplet precautions and given private room. Will continue to monitor.     Hazel Ya  [id number]

## 2021-01-04 NOTE — PROGRESS NOTE ADULT - EXTREMITIES COMMENTS
bilateral calves soft, NT no erythema or warmth
bilateral calves soft, NT no erythema or warmth  left UE 4/5 left HF 3/5 quad 3+/5
calves soft, NT bilaterally

## 2021-01-04 NOTE — DISCHARGE NOTE PROVIDER - CARE PROVIDER_API CALL
neurologist, Health system/[place]  Phone: (   )    -  Fax: (   )    -  Follow Up Time:     Endocrinologist, [place]  Phone: (   )    -  Fax: (   )    -  Follow Up Time:    neurologist, [facility]/[place]  Phone: (   )    -  Fax: (   )    -  Follow Up Time:     Endocrinologist, Outside  Phone: (   )    -  Fax: (   )    -  Follow Up Time:     Rayna Ballard  Physical Medicine and Rehabilitation  [address] [place] [address]  Phone: [phone]  Fax: [phone]  Follow Up Time: 1 month

## 2021-01-04 NOTE — PROGRESS NOTE ADULT - CONSTITUTIONAL COMMENTS
imrpoved arousal +reduced initiation and processing, but improving. mood stable
alert, mood fair.
sustained eye opening, reduced attention

## 2021-01-04 NOTE — DISCHARGE NOTE PROVIDER - NSDCCPCAREPLAN_GEN_ALL_CORE_FT
PRINCIPAL DISCHARGE DIAGNOSIS  Diagnosis: Left pontine stroke  Assessment and Plan of Treatment: #Acute Left Pontine Stroke with left upper and bilateral lower extremity weakness, dysarthria, dysphagia, and cognitive impairment  -finished course of Comprehensive Rehab Program of PT/OT/SLP  3 hours a day for 5 days a week at [facility]  - family training today 1/4  -continue secondary PPX with  ASA 81mg po daily, ticagrelor 90mg po q12h , Atorvastatin 80mg po qhs  -continue amantadine 100 mg daily for cognition  - Please follow up with neurologist from [facility]/[place] within 1-2 weeks from discharge.  - Pt also c/o left sided cerivcal numbness present for about a year.. Cspine Xray negative for DJD. Please f/u with neurologist as well.      SECONDARY DISCHARGE DIAGNOSES  Diagnosis: Diabetes mellitus  Assessment and Plan of Treatment: uncontrolled. A1C - 7.6 (12/24)  -Better controlled w/ lantus 45 units as well increased lispro to 16u qAC       PRINCIPAL DISCHARGE DIAGNOSIS  Diagnosis: Left pontine stroke  Assessment and Plan of Treatment: #Acute Left Pontine Stroke with left upper and bilateral lower extremity weakness, dysarthria, dysphagia, and cognitive impairment  -finished course of Comprehensive Rehab Program of PT/OT/SLP  3 hours a day for 5 days a week at [facility]  - family training today 1/4  -continue secondary PPX with  ASA 81mg po daily, ticagrelor 90mg po q12h , Atorvastatin 80mg po qhs  -continue amantadine 100 mg daily for cognition  - Please follow up with neurologist from [facility]/[place] within 1-2 weeks from discharge.  - Pt also c/o left sided cerivcal numbness present for about a year.. Cspine Xray negative for DJD. Please f/u with neurologist as well.      SECONDARY DISCHARGE DIAGNOSES  Diagnosis: Diabetes mellitus  Assessment and Plan of Treatment: uncontrolled. A1C - 7.6 (12/24)  -Better controlled w/ lantus 45 units as well increased lispro to 16u qAC  - she was not on Tresiba, metformin, nor januvia she takes at home, but may restart it with adjusment by her endocrinologist the next day.

## 2021-01-04 NOTE — PROGRESS NOTE ADULT - RS GEN PE MLT RESP DETAILS PC
respirations non-labored/no rales/no rhonchi/no subcutaneous emphysema/no wheezes
breath sounds equal/respirations non-labored/clear to auscultation bilaterally
breath sounds equal/respirations non-labored/clear to auscultation bilaterally

## 2021-01-04 NOTE — PROGRESS NOTE ADULT - SUBJECTIVE AND OBJECTIVE BOX
Patient is a 65y old  Female who presents with a chief complaint of Left Pontine CVA with left sided weakness of upper extremities and bilateral lower extremity weakness (04 Jan 2021 09:52)      HPI:  Patient is a 65 year old female RH-dominant with PMH including HTN, HLD, +LOOP, asthma, hypothyroidism, DM, CVA x 3 (last in 2018) with residual left sided weakness and mild dysarthria, who presented to [facility] on 12/14/20 with worsening left sided weakness and slurring. Son also reported that the patient fell 3 times on the day of admission; patient herself denied pain. Imaging revealed acute L pontine infarct. Motor strength 3/5 LUE, 4/5 LLE, 5/5 . Intact sensation. Cleared by SLP for soft solid, nectar-thick liquid diet. Patient is on ASA, tigraglecor and atorvastatin.    Hospital Course complicated by UTI on course of PO Cipro 500 q12h. ENT was consulted for noted asymmetry in the nasopharynx on CT; no pathology noted on fiberoptic laryngoscopy. Bilateral TVC movement was noted, with no upper airway obstruction. No ENT surgical intervention recommended. Patient was stabilized and discharged to [facility] for Acute inpatient rehab on 12/21/20. (21 Dec 2020 14:36)      PAST MEDICAL & SURGICAL HISTORY:      MEDICATIONS  (STANDING):  amantadine 100 milliGRAM(s) Oral daily  aspirin enteric coated 81 milliGRAM(s) Oral daily  atorvastatin 80 milliGRAM(s) Oral at bedtime  dextrose 40% Gel 15 Gram(s) Oral once  dextrose 5%. 1000 milliLiter(s) (50 mL/Hr) IV Continuous <Continuous>  dextrose 5%. 1000 milliLiter(s) (100 mL/Hr) IV Continuous <Continuous>  dextrose 50% Injectable 25 Gram(s) IV Push once  dextrose 50% Injectable 12.5 Gram(s) IV Push once  dextrose 50% Injectable 25 Gram(s) IV Push once  enoxaparin Injectable 40 milliGRAM(s) SubCutaneous daily  glucagon  Injectable 1 milliGRAM(s) IntraMuscular once  insulin glargine Injectable (LANTUS) 45 Unit(s) SubCutaneous at bedtime  insulin lispro (ADMELOG) corrective regimen sliding scale   SubCutaneous at bedtime  insulin lispro (ADMELOG) corrective regimen sliding scale   SubCutaneous three times a day before meals  insulin lispro Injectable (ADMELOG) 16 Unit(s) SubCutaneous three times a day before meals  levothyroxine 75 MICROGram(s) Oral daily  lisinopril 40 milliGRAM(s) Oral daily  multivitamin 1 Tablet(s) Oral daily  pantoprazole    Tablet 40 milliGRAM(s) Oral before breakfast  senna 2 Tablet(s) Oral at bedtime  ticagrelor 90 milliGRAM(s) Oral two times a day    MEDICATIONS  (PRN):  acetaminophen   Tablet .. 650 milliGRAM(s) Oral every 6 hours PRN Mild Pain (1 - 3), Moderate Pain (4 - 6)  ALBUTerol    90 MICROgram(s) HFA Inhaler 1 Puff(s) Inhalation every 4 hours PRN Shortness of Breath and/or Wheezing  guaiFENesin   Syrup  (Sugar-Free) 100 milliGRAM(s) Oral every 6 hours PRN Cough  polyethylene glycol 3350 17 Gram(s) Oral daily PRN Constipation      Allergies    No Known Allergies    Intolerances          VITALS  65y  Vital Signs Last 24 Hrs  T(C): 36.8 (04 Jan 2021 07:53), Max: 36.8 (04 Jan 2021 07:53)  T(F): 98.2 (04 Jan 2021 07:53), Max: 98.2 (04 Jan 2021 07:53)  HR: 68 (04 Jan 2021 07:53) (68 - 68)  BP: 128/76 (04 Jan 2021 07:53) (111/65 - 128/76)  BP(mean): --  RR: 14 (04 Jan 2021 07:53) (14 - 15)  SpO2: 99% (04 Jan 2021 07:53) (96% - 99%)  Daily     Daily         RECENT LABS:                          13.0   9.52  )-----------( 187      ( 04 Jan 2021 05:00 )             39.1     01-04    142  |  104  |  23  ----------------------------<  197<H>  3.9   |  27  |  0.93    Ca    9.6      04 Jan 2021 05:00    TPro  7.5  /  Alb  3.9  /  TBili  0.5  /  DBili  x   /  AST  23  /  ALT  48<H>  /  AlkPhos  89  01-04    LIVER FUNCTIONS - ( 04 Jan 2021 05:00 )  Alb: 3.9 g/dL / Pro: 7.5 g/dL / ALK PHOS: 89 U/L / ALT: 48 U/L / AST: 23 U/L / GGT: x                   CAPILLARY BLOOD GLUCOSE      POCT Blood Glucose.: 185 mg/dL (04 Jan 2021 11:39)  POCT Blood Glucose.: 191 mg/dL (04 Jan 2021 07:27)  POCT Blood Glucose.: 195 mg/dL (03 Jan 2021 21:50)  POCT Blood Glucose.: 145 mg/dL (03 Jan 2021 16:52)          EXAM:  C-SPINE W OBLIQUES 4-5 VIEWS      PROCEDURE DATE:  12/31/2020        INTERPRETATION:  HISTORY: Injury with Pain    TECHNIQUE: AP, lateral and open-mouth views of cervical spine    FINDINGS:  There is no acute fracture or subluxation.  No compression deformities or disk space narrowing is .  No pre vertebral soft tissue swelling.  The lateral masses are symmetric and intact. There is mild bilateral facet arthrosis.  The odontoid process and C1-C2 articulation is intact.  The visualized upper ribs and upper lung fields are within normal limits.    IMPRESSION: Mild bilateral facet arthrosis.  No acute fracture or subluxation .    If pain persist despite conservative therapy and occult fracture or disc herniation causing central canal or foraminal nerve root compression is clinically suspected, further assessment with CT or MRI recommended.              DANNY MAK MD; Attending Radiologist  This document has been electronically signed. Jan 1 2021  9:10AM

## 2021-01-04 NOTE — DISCHARGE NOTE PROVIDER - NSDCFUADDINST_GEN_ALL_CORE_FT
She was not on Tresiba, metformin, nor januvia she takes at home, but may restart it on discharge with adjustment by her endocrinologist the next day.

## 2021-01-04 NOTE — DISCHARGE NOTE PROVIDER - NSFOLLOWUPCLINICS_GEN_ALL_ED_FT
A Family Medicine Doctor  Family Medicine  .  [place]   Phone:   Fax:   Follow Up Time:     A Neurologist  Neurology  .  [place]   Phone:   Fax:   Follow Up Time: 2 weeks

## 2021-01-04 NOTE — PROGRESS NOTE ADULT - ASSESSMENT
65y old  Female s/p Left Pontine CVA, left hemiparesis, here for rehab- pt/ot/dvt ppx    cva- asa, Brilinta  Statin      HTN-  lisinopril 40    DM2   a1c 7.5  Lantus  45 unit 12/31  lispro increased to 16u q AC 12/31    hypothyroidism- synthroid    DVT ppx  Lovenox    will follow

## 2021-01-05 ENCOUNTER — TRANSCRIPTION ENCOUNTER (OUTPATIENT)
Age: 66
End: 2021-01-05

## 2021-01-05 VITALS
SYSTOLIC BLOOD PRESSURE: 99 MMHG | OXYGEN SATURATION: 97 % | TEMPERATURE: 98 F | DIASTOLIC BLOOD PRESSURE: 64 MMHG | HEART RATE: 74 BPM | RESPIRATION RATE: 14 BRPM

## 2021-01-05 LAB — SARS-COV-2 RNA SPEC QL NAA+PROBE: SIGNIFICANT CHANGE UP

## 2021-01-05 PROCEDURE — 99239 HOSP IP/OBS DSCHRG MGMT >30: CPT

## 2021-01-05 PROCEDURE — 99233 SBSQ HOSP IP/OBS HIGH 50: CPT

## 2021-01-05 RX ORDER — ASPIRIN/CALCIUM CARB/MAGNESIUM 324 MG
1 TABLET ORAL
Qty: 30 | Refills: 0
Start: 2021-01-05 | End: 2021-02-03

## 2021-01-05 RX ORDER — AMANTADINE HCL 100 MG
1 CAPSULE ORAL
Qty: 30 | Refills: 0
Start: 2021-01-05 | End: 2021-02-03

## 2021-01-05 RX ORDER — LEVOTHYROXINE SODIUM 125 MCG
1 TABLET ORAL
Qty: 30 | Refills: 0
Start: 2021-01-05 | End: 2021-02-03

## 2021-01-05 RX ORDER — INSULIN DEGLUDEC 100 U/ML
22 INJECTION, SOLUTION SUBCUTANEOUS
Qty: 660 | Refills: 0
Start: 2021-01-05 | End: 2021-02-03

## 2021-01-05 RX ORDER — TICAGRELOR 90 MG/1
1 TABLET ORAL
Qty: 60 | Refills: 0
Start: 2021-01-05 | End: 2021-02-03

## 2021-01-05 RX ORDER — ATORVASTATIN CALCIUM 80 MG/1
1 TABLET, FILM COATED ORAL
Qty: 30 | Refills: 0
Start: 2021-01-05 | End: 2021-02-03

## 2021-01-05 RX ORDER — RAMIPRIL 5 MG
1 CAPSULE ORAL
Qty: 30 | Refills: 0
Start: 2021-01-05 | End: 2021-02-03

## 2021-01-05 RX ADMIN — Medication 75 MICROGRAM(S): at 06:02

## 2021-01-05 RX ADMIN — Medication 100 MILLIGRAM(S): at 12:07

## 2021-01-05 RX ADMIN — Medication 2: at 08:18

## 2021-01-05 RX ADMIN — Medication 4: at 12:07

## 2021-01-05 RX ADMIN — Medication 81 MILLIGRAM(S): at 12:07

## 2021-01-05 RX ADMIN — Medication 16 UNIT(S): at 08:18

## 2021-01-05 RX ADMIN — Medication 16 UNIT(S): at 12:07

## 2021-01-05 RX ADMIN — Medication 1 TABLET(S): at 12:07

## 2021-01-05 RX ADMIN — TICAGRELOR 90 MILLIGRAM(S): 90 TABLET ORAL at 06:01

## 2021-01-05 RX ADMIN — LISINOPRIL 40 MILLIGRAM(S): 2.5 TABLET ORAL at 06:02

## 2021-01-05 RX ADMIN — PANTOPRAZOLE SODIUM 40 MILLIGRAM(S): 20 TABLET, DELAYED RELEASE ORAL at 06:01

## 2021-01-05 NOTE — PROGRESS NOTE ADULT - SUBJECTIVE AND OBJECTIVE BOX
Patient is a 65y old  Female who presents with a chief complaint of Left Pontine CVA with left sided weakness of upper extremities and bilateral lower extremity weakness (05 Jan 2021 10:32)      HPI:  Patient is a 65 year old female RH-dominant with PMH including HTN, HLD, +LOOP, asthma, hypothyroidism, DM, CVA x 3 (last in 2018) with residual left sided weakness and mild dysarthria, who presented to [facility] on 12/14/20 with worsening left sided weakness and slurring. Son also reported that the patient fell 3 times on the day of admission; patient herself denied pain. Imaging revealed acute L pontine infarct. Motor strength 3/5 LUE, 4/5 LLE, 5/5 . Intact sensation. Cleared by SLP for soft solid, nectar-thick liquid diet. Patient is on ASA, tigraglecor and atorvastatin.    Hospital Course complicated by UTI on course of PO Cipro 500 q12h. ENT was consulted for noted asymmetry in the nasopharynx on CT; no pathology noted on fiberoptic laryngoscopy. Bilateral TVC movement was noted, with no upper airway obstruction. No ENT surgical intervention recommended. Patient was stabilized and discharged to [facility] for Acute inpatient rehab on 12/21/20. (21 Dec 2020 14:36)      PAST MEDICAL & SURGICAL HISTORY:      MEDICATIONS  (STANDING):  amantadine 100 milliGRAM(s) Oral daily  aspirin enteric coated 81 milliGRAM(s) Oral daily  atorvastatin 80 milliGRAM(s) Oral at bedtime  dextrose 40% Gel 15 Gram(s) Oral once  dextrose 5%. 1000 milliLiter(s) (50 mL/Hr) IV Continuous <Continuous>  dextrose 5%. 1000 milliLiter(s) (100 mL/Hr) IV Continuous <Continuous>  dextrose 50% Injectable 25 Gram(s) IV Push once  dextrose 50% Injectable 12.5 Gram(s) IV Push once  dextrose 50% Injectable 25 Gram(s) IV Push once  enoxaparin Injectable 40 milliGRAM(s) SubCutaneous daily  glucagon  Injectable 1 milliGRAM(s) IntraMuscular once  insulin glargine Injectable (LANTUS) 45 Unit(s) SubCutaneous at bedtime  insulin lispro (ADMELOG) corrective regimen sliding scale   SubCutaneous three times a day before meals  insulin lispro (ADMELOG) corrective regimen sliding scale   SubCutaneous at bedtime  insulin lispro Injectable (ADMELOG) 16 Unit(s) SubCutaneous three times a day before meals  levothyroxine 75 MICROGram(s) Oral daily  lisinopril 40 milliGRAM(s) Oral daily  multivitamin 1 Tablet(s) Oral daily  pantoprazole    Tablet 40 milliGRAM(s) Oral before breakfast  senna 2 Tablet(s) Oral at bedtime  ticagrelor 90 milliGRAM(s) Oral two times a day    MEDICATIONS  (PRN):  acetaminophen   Tablet .. 650 milliGRAM(s) Oral every 6 hours PRN Mild Pain (1 - 3), Moderate Pain (4 - 6)  ALBUTerol    90 MICROgram(s) HFA Inhaler 1 Puff(s) Inhalation every 4 hours PRN Shortness of Breath and/or Wheezing  guaiFENesin   Syrup  (Sugar-Free) 100 milliGRAM(s) Oral every 6 hours PRN Cough  polyethylene glycol 3350 17 Gram(s) Oral daily PRN Constipation      Allergies    No Known Allergies    Intolerances          VITALS  65y  Vital Signs Last 24 Hrs  T(C): 36.9 (05 Jan 2021 07:48), Max: 36.9 (05 Jan 2021 07:48)  T(F): 98.5 (05 Jan 2021 07:48), Max: 98.5 (05 Jan 2021 07:48)  HR: 74 (05 Jan 2021 07:48) (71 - 80)  BP: 99/64 (05 Jan 2021 07:48) (99/64 - 118/68)  BP(mean): --  RR: 14 (05 Jan 2021 07:48) (14 - 14)  SpO2: 97% (05 Jan 2021 07:48) (94% - 98%)  Daily     Daily         RECENT LABS:                          13.0   9.52  )-----------( 187      ( 04 Jan 2021 05:00 )             39.1     01-04    142  |  104  |  23  ----------------------------<  197<H>  3.9   |  27  |  0.93    Ca    9.6      04 Jan 2021 05:00    TPro  7.5  /  Alb  3.9  /  TBili  0.5  /  DBili  x   /  AST  23  /  ALT  48<H>  /  AlkPhos  89  01-04    LIVER FUNCTIONS - ( 04 Jan 2021 05:00 )  Alb: 3.9 g/dL / Pro: 7.5 g/dL / ALK PHOS: 89 U/L / ALT: 48 U/L / AST: 23 U/L / GGT: x                   CAPILLARY BLOOD GLUCOSE      POCT Blood Glucose.: 228 mg/dL (05 Jan 2021 12:06)  POCT Blood Glucose.: 172 mg/dL (05 Jan 2021 07:44)  POCT Blood Glucose.: 238 mg/dL (04 Jan 2021 22:00)  POCT Blood Glucose.: 141 mg/dL (04 Jan 2021 17:00)      Review of Systems:   · Additional ROS	Patient stable, roommate tested + for COVID-19 and patient was swabbed 1/5/21, test returned negative. Patient has chronic cough, does not feel cough has changed in quality or severity, no fever, no chills. Looking forward to dc home     Family also informed and agreeable with discharge given lack of sx and negative result. Importance of quarantine, mask wear, hand hygeiene reviewed with patient who verbalized understanding    Physical Exam:   · Constitutional	detailed exam  · Constitutional Comments	alert, mood stable, no lability today  · Respiratory	detailed exam  · Respiratory Details	breath sounds equal; respirations non-labored; clear to auscultation bilaterally  no R/r/W  · Cardiovascular	detailed exam  · Cardiovascular Details	regular rate and rhythm  · Gastrointestinal	detailed exam  · GI Normal	soft; nontender; bowel sounds normal  · Extremities	detailed exam  · Extremities Comments	calves soft, NT bilaterally

## 2021-01-05 NOTE — PROGRESS NOTE ADULT - ASSESSMENT
65y F with a history of HTN, DM, HLD, +LOOP, asthma, hypothyroidism, CVA who presented to [facility] on 12/14/20 with acute Left Pontine Stroke.    #Acute Pontine Stroke with left upper and bilateral lower extremity weakness, dysarthria, dysphagia, and cognitive impairment  -continue secondary PPX with  ASA 81mg po daily, ticagrelor 90mg po q12h , Atorvastatin 80mg po qhs  -continue amantadine 100 mg daily  -for home care referral with home PT, OT, SLP on dc  -Precautions: cardiac, DM, aspiration, fall, AMS    #left -sided cervical numbness  -area described not in dermatomal distribution and also has SCM/scalene tightness on exam, likely muscular/myofascial  -C spine Xray:  Mild bilateral facet arthrosis. No acute fracture or subluxation.  -follow up with neurology for further workup as outpatient, discussed with patient    #HTN  - Lisinopril 40mg po daily   - amlodipine 5mg po daily  -PCP f/u on dc    #hypothyroidism   - Levothyroxine 75mcg po daily    #DM: uncontrolled. A1C - 7.6 (12/24)  Lantus  45 unit 12/31, lispro increased to 16u q AC 12/31  -can resume home metformin and Tresiba on d/c. f/u with endo as outpatient. hospitalist recs appreciated    #Asthma  - albuterol MDI 2 pulls inhalation q4h PRN    #Pain Mgmt   - Tylenol PRN    #GI/Bowel Mgmt   -  Senna, Miralax PRN  -GERD: on protonix 40 mg daily    #FEN   - soft solids and thin liquids , CCD no snacks, low sodium    #Case discussed in IDT rounds 12/30:  -CS/CG transfers, ambulates 100 feet RW and CG, negotiates 8 steps with CG. min assist bathing, supervision UB dressing, CG LB dressing. CG toileting and toilet transfers  -goals: mod independent bADLS, household ambulation distances and transfers  -caregiver training performed in -person 1/4  -medically cleared for dc home today 1/5/21 with caregiver support andh ome PT, OT, SLP services. Monitoring for symptoms following COVID-19 exposure (COVID-19 PCR 1/5 negative) with quarantine and hygiene/mask wear reviewed. Sw confirmed home care will be seeing patient in community. To follow with PCP, endocrinology, neurology, PM+R on discharge. Patient and family agreeable, time spent for education, coordination and planning >30 min

## 2021-01-05 NOTE — PROGRESS NOTE ADULT - NUTRITIONAL ASSESSMENT
This patient has been assessed with a concern for Malnutrition and has been determined to have a diagnosis/diagnoses of Moderate protein-calorie malnutrition.    This patient is being managed with:   Diet Dysphagia 3 Soft-Thin Liquids-  Consistent Carbohydrate {No Snacks}  Low Sodium  Supplement Feeding Modality:  Oral  Glucerna Shake Cans or Servings Per Day:  1       Frequency:  Two Times a day  Entered: Dec 30 2020  2:20PM    
This patient has been assessed with a concern for Malnutrition and has been determined to have a diagnosis/diagnoses of Moderate protein-calorie malnutrition.    This patient is being managed with:   Diet Dysphagia 3 Soft-Nectar Consistency Fluid-  Consistent Carbohydrate {No Snacks}  Low Sodium  Supplement Feeding Modality:  Oral  Glucerna Shake Cans or Servings Per Day:  1       Frequency:  Two Times a day  Entered: Dec 24 2020  7:53AM    
This patient has been assessed with a concern for Malnutrition and has been determined to have a diagnosis/diagnoses of Moderate protein-calorie malnutrition.    This patient is being managed with:   Diet Dysphagia 3 Soft-Thin Liquids-  Consistent Carbohydrate {No Snacks}  Low Sodium  Supplement Feeding Modality:  Oral  Glucerna Shake Cans or Servings Per Day:  1       Frequency:  Two Times a day  Entered: Dec 30 2020  2:20PM    
This patient has been assessed with a concern for Malnutrition and has been determined to have a diagnosis/diagnoses of Moderate protein-calorie malnutrition.    This patient is being managed with:   Diet Dysphagia 3 Soft-Thin Liquids-  Consistent Carbohydrate {No Snacks}  Low Sodium  Supplement Feeding Modality:  Oral  Glucerna Shake Cans or Servings Per Day:  1       Frequency:  Two Times a day  Entered: Dec 30 2020  2:20PM    
This patient has been assessed with a concern for Malnutrition and has been determined to have a diagnosis/diagnoses of Moderate protein-calorie malnutrition.    This patient is being managed with:   Diet Dysphagia 3 Soft-Nectar Consistency Fluid-  Consistent Carbohydrate {No Snacks}  Low Sodium  Supplement Feeding Modality:  Oral  Glucerna Shake Cans or Servings Per Day:  1       Frequency:  Two Times a day  Entered: Dec 24 2020  7:53AM    
This patient has been assessed with a concern for Malnutrition and has been determined to have a diagnosis/diagnoses of Moderate protein-calorie malnutrition.    This patient is being managed with:   Diet Dysphagia 3 Soft-Nectar Consistency Fluid-  Consistent Carbohydrate {No Snacks}  Low Sodium  Supplement Feeding Modality:  Oral  Glucerna Shake Cans or Servings Per Day:  1       Frequency:  Two Times a day  Entered: Dec 24 2020  7:53AM    
This patient has been assessed with a concern for Malnutrition and has been determined to have a diagnosis/diagnoses of Moderate protein-calorie malnutrition.    This patient is being managed with:   Diet Dysphagia 3 Soft-Thin Liquids-  Consistent Carbohydrate {No Snacks}  Low Sodium  Supplement Feeding Modality:  Oral  Glucerna Shake Cans or Servings Per Day:  1       Frequency:  Two Times a day  Entered: Dec 30 2020  2:20PM    
This patient has been assessed with a concern for Malnutrition and has been determined to have a diagnosis/diagnoses of Moderate protein-calorie malnutrition.    This patient is being managed with:   Diet Dysphagia 3 Soft-Nectar Consistency Fluid-  Consistent Carbohydrate {No Snacks}  Low Sodium  Supplement Feeding Modality:  Oral  Glucerna Shake Cans or Servings Per Day:  1       Frequency:  Two Times a day  Entered: Dec 24 2020  7:53AM    
This patient has been assessed with a concern for Malnutrition and has been determined to have a diagnosis/diagnoses of Moderate protein-calorie malnutrition.    This patient is being managed with:   Diet Dysphagia 3 Soft-Nectar Consistency Fluid-  Consistent Carbohydrate {No Snacks}  Low Sodium  Supplement Feeding Modality:  Oral  Glucerna Shake Cans or Servings Per Day:  1       Frequency:  Two Times a day  Entered: Dec 24 2020  7:53AM      This patient has been assessed with a concern for Malnutrition and has been determined to have a diagnosis/diagnoses of Moderate protein-calorie malnutrition.    This patient is being managed with:   Diet Dysphagia 3 Soft-Nectar Consistency Fluid-  Consistent Carbohydrate {No Snacks}  Low Sodium  Supplement Feeding Modality:  Oral  Glucerna Shake Cans or Servings Per Day:  1       Frequency:  Two Times a day  Entered: Dec 24 2020  7:53AM      This patient has been assessed with a concern for Malnutrition and has been determined to have a diagnosis/diagnoses of Moderate protein-calorie malnutrition.    This patient is being managed with:   Diet Dysphagia 3 Soft-Nectar Consistency Fluid-  Consistent Carbohydrate {No Snacks}  Low Sodium  Supplement Feeding Modality:  Oral  Glucerna Shake Cans or Servings Per Day:  1       Frequency:  Two Times a day  Entered: Dec 24 2020  7:53AM    
This patient has been assessed with a concern for Malnutrition and has been determined to have a diagnosis/diagnoses of Moderate protein-calorie malnutrition.    This patient is being managed with:   Diet Dysphagia 3 Soft-Thin Liquids-  Consistent Carbohydrate {No Snacks}  Low Sodium  Supplement Feeding Modality:  Oral  Glucerna Shake Cans or Servings Per Day:  1       Frequency:  Two Times a day  Entered: Dec 30 2020  2:20PM    
This patient has been assessed with a concern for Malnutrition and has been determined to have a diagnosis/diagnoses of Moderate protein-calorie malnutrition.    This patient is being managed with:   Diet Dysphagia 3 Soft-Thin Liquids-  Consistent Carbohydrate {No Snacks}  Low Sodium  Supplement Feeding Modality:  Oral  Glucerna Shake Cans or Servings Per Day:  1       Frequency:  Two Times a day  Entered: Dec 30 2020  2:20PM    
This patient has been assessed with a concern for Malnutrition and has been determined to have a diagnosis/diagnoses of Moderate protein-calorie malnutrition.    This patient is being managed with:   Diet Dysphagia 3 Soft-Nectar Consistency Fluid-  Consistent Carbohydrate {No Snacks}  Low Sodium  Supplement Feeding Modality:  Oral  Glucerna Shake Cans or Servings Per Day:  1       Frequency:  Two Times a day  Entered: Dec 24 2020  7:53AM    
This patient has been assessed with a concern for Malnutrition and has been determined to have a diagnosis/diagnoses of Moderate protein-calorie malnutrition.    This patient is being managed with:   Diet Dysphagia 3 Soft-Nectar Consistency Fluid-  Consistent Carbohydrate {No Snacks}  Low Sodium  Supplement Feeding Modality:  Oral  Glucerna Shake Cans or Servings Per Day:  1       Frequency:  Two Times a day  Entered: Dec 24 2020  7:53AM    
This patient has been assessed with a concern for Malnutrition and has been determined to have a diagnosis/diagnoses of Moderate protein-calorie malnutrition.    This patient is being managed with:   Diet Dysphagia 3 Soft-Thin Liquids-  Consistent Carbohydrate {No Snacks}  Low Sodium  Supplement Feeding Modality:  Oral  Glucerna Shake Cans or Servings Per Day:  1       Frequency:  Two Times a day  Entered: Dec 30 2020  2:20PM    
This patient has been assessed with a concern for Malnutrition and has been determined to have a diagnosis/diagnoses of Moderate protein-calorie malnutrition.    This patient is being managed with:   Diet Dysphagia 3 Soft-Nectar Consistency Fluid-  Consistent Carbohydrate {No Snacks}  Low Sodium  Supplement Feeding Modality:  Oral  Glucerna Shake Cans or Servings Per Day:  1       Frequency:  Two Times a day  Entered: Dec 24 2020  7:53AM

## 2021-01-05 NOTE — PROGRESS NOTE ADULT - PROVIDER SPECIALTY LIST ADULT
Hospitalist
Hospitalist
Internal Medicine
Physiatry
Rehab Medicine
Rehab Medicine
Hospitalist
Hospitalist
Physiatry
Hospitalist
Internal Medicine
Physiatry
Rehab Medicine
Internal Medicine
Physiatry
Hospitalist
Physiatry

## 2021-01-05 NOTE — PROGRESS NOTE ADULT - ASSESSMENT
65y old  Female s/p Left Pontine CVA, left hemiparesis, here for rehab- pt/ot/dvt ppx    cva- asa, Brilinta  Statin    HTN-  lisinopril 40    DM2   a1c 7.5  Lantus  45 unit 12/31  lispro increased to 16u q AC 12/31  can resume home metformin and Tresiba on d/c. f/u with endo as outpatient.     hypothyroidism- synthroid    DVT ppx  Lovenox    exposure to covid- repeat swab pending if negative dtr will take patient home, as scheduled  will follow  d/w dr. sapmson

## 2021-01-05 NOTE — DISCHARGE NOTE NURSING/CASE MANAGEMENT/SOCIAL WORK - PATIENT PORTAL LINK FT
You can access the FollowMyHealth Patient Portal offered by [facility] by registering at the following website: [email or web address]. By joining [facility]’s FollowMyHealth portal, you will also be able to view your health information using other applications (apps) compatible with our system.

## 2021-01-05 NOTE — PROGRESS NOTE ADULT - REASON FOR ADMISSION
Left Pontine CVA with left sided weakness of upper extremities and bilateral lower extremity weakness
Left Pontine CVA with left sided weakness of upper extremities and bilateral lower extremity weakness
Left Pontine CVA with left sided weakness of upper extremities and bilateral lower extremity weakness  Impairment code: 01.3 Bilateral Involvement
Left Pontine CVA with left sided weakness of upper extremities and bilateral lower extremity weakness
Left Pontine CVA with left sided weakness of upper extremities and bilateral lower extremity weakness  Bilateral Involvement
Left Pontine CVA with left sided weakness of upper extremities and bilateral lower extremity weakness  Impairment code: 01.3 Bilateral Involvement
Left Pontine CVA with left sided weakness of upper extremities and bilateral lower extremity weakness
Left Pontine CVA with left sided weakness of upper extremities and bilateral lower extremity weakness  Impairment code: 01.3 Bilateral Involvement

## 2021-01-05 NOTE — PROGRESS NOTE ADULT - SUBJECTIVE AND OBJECTIVE BOX
65y old  Female s/p Left Pontine CVA    seen at the bedside, No overnight events noted.  no n/v, no sob, tolerating po diet, and PT.      Vital Signs Last 24 Hrs  T(C): 36.9 (05 Jan 2021 07:48), Max: 36.9 (05 Jan 2021 07:48)  T(F): 98.5 (05 Jan 2021 07:48), Max: 98.5 (05 Jan 2021 07:48)  HR: 74 (05 Jan 2021 07:48) (71 - 80)  BP: 99/64 (05 Jan 2021 07:48) (99/64 - 118/68)  BP(mean): --  RR: 14 (05 Jan 2021 07:48) (14 - 14)  SpO2: 97% (05 Jan 2021 07:48) (94% - 98%)    GENERAL- NAD  EAR/NOSE/MOUTH/THROAT - no pharyngeal exudates, no oral lesions,  MMM  EYES- KELLY, conjunctiva and Sclera clear  NECK- supple  RESPIRATORY-  clear to auscultation bilaterally  CARDIOVASCULAR - SIS2, RRR  GI - soft NT BS present  EXTREMITIES- no pedal edema  NEUROLOGY- left weakness, improved  SKIN- no rashes, warm to touch  PSYCHIATRY- AAO X 3                      13.0                 142  | 27   | 23           9.52  >-----------< 187     ------------------------< 197                   39.1                 3.9  | 104  | 0.93                                         Ca 9.6   Mg x     Ph x        CAPILLARY BLOOD GLUCOSE      POCT Blood Glucose.: 172 mg/dL (05 Jan 2021 07:44)  POCT Blood Glucose.: 238 mg/dL (04 Jan 2021 22:00)  POCT Blood Glucose.: 141 mg/dL (04 Jan 2021 17:00)  POCT Blood Glucose.: 185 mg/dL (04 Jan 2021 11:39)    MEDICATIONS  (STANDING):  amantadine 100 milliGRAM(s) Oral daily  aspirin enteric coated 81 milliGRAM(s) Oral daily  atorvastatin 80 milliGRAM(s) Oral at bedtime  enoxaparin Injectable 40 milliGRAM(s) SubCutaneous daily  glucagon  Injectable 1 milliGRAM(s) IntraMuscular once  insulin glargine Injectable (LANTUS) 45 Unit(s) SubCutaneous at bedtime  insulin lispro (ADMELOG) corrective regimen sliding scale   SubCutaneous at bedtime  insulin lispro (ADMELOG) corrective regimen sliding scale   SubCutaneous three times a day before meals  insulin lispro Injectable (ADMELOG) 16 Unit(s) SubCutaneous three times a day before meals  levothyroxine 75 MICROGram(s) Oral daily  lisinopril 40 milliGRAM(s) Oral daily  multivitamin 1 Tablet(s) Oral daily  pantoprazole    Tablet 40 milliGRAM(s) Oral before breakfast  senna 2 Tablet(s) Oral at bedtime  ticagrelor 90 milliGRAM(s) Oral two times a day    MEDICATIONS  (PRN):  acetaminophen   Tablet .. 650 milliGRAM(s) Oral every 6 hours PRN Mild Pain (1 - 3), Moderate Pain (4 - 6)  ALBUTerol    90 MICROgram(s) HFA Inhaler 1 Puff(s) Inhalation every 4 hours PRN Shortness of Breath and/or Wheezing  guaiFENesin   Syrup  (Sugar-Free) 100 milliGRAM(s) Oral every 6 hours PRN Cough  polyethylene glycol 3350 17 Gram(s) Oral daily PRN Constipation

## 2021-01-05 NOTE — DISCHARGE NOTE NURSING/CASE MANAGEMENT/SOCIAL WORK - NSSCCARECORD_GEN_ALL_CORE
Home Care Agency/Durable Medical Equipment Agency Home Care Agency/Durable Medical Equipment Agency/Community [facility]

## 2021-01-27 PROCEDURE — 82962 GLUCOSE BLOOD TEST: CPT

## 2021-01-27 PROCEDURE — 72050 X-RAY EXAM NECK SPINE 4/5VWS: CPT

## 2021-01-27 PROCEDURE — 84436 ASSAY OF TOTAL THYROXINE: CPT

## 2021-01-27 PROCEDURE — 97530 THERAPEUTIC ACTIVITIES: CPT

## 2021-01-27 PROCEDURE — 97163 PT EVAL HIGH COMPLEX 45 MIN: CPT

## 2021-01-27 PROCEDURE — 36415 COLL VENOUS BLD VENIPUNCTURE: CPT

## 2021-01-27 PROCEDURE — 84480 ASSAY TRIIODOTHYRONINE (T3): CPT

## 2021-01-27 PROCEDURE — U0005: CPT

## 2021-01-27 PROCEDURE — 80053 COMPREHEN METABOLIC PANEL: CPT

## 2021-01-27 PROCEDURE — 97116 GAIT TRAINING THERAPY: CPT

## 2021-01-27 PROCEDURE — 93005 ELECTROCARDIOGRAM TRACING: CPT

## 2021-01-27 PROCEDURE — 97167 OT EVAL HIGH COMPLEX 60 MIN: CPT

## 2021-01-27 PROCEDURE — 92610 EVALUATE SWALLOWING FUNCTION: CPT

## 2021-01-27 PROCEDURE — 81001 URINALYSIS AUTO W/SCOPE: CPT

## 2021-01-27 PROCEDURE — 84443 ASSAY THYROID STIM HORMONE: CPT

## 2021-01-27 PROCEDURE — 87086 URINE CULTURE/COLONY COUNT: CPT

## 2021-01-27 PROCEDURE — 92611 MOTION FLUOROSCOPY/SWALLOW: CPT

## 2021-01-27 PROCEDURE — 87635 SARS-COV-2 COVID-19 AMP PRB: CPT

## 2021-01-27 PROCEDURE — 85025 COMPLETE CBC W/AUTO DIFF WBC: CPT

## 2021-01-27 PROCEDURE — 92526 ORAL FUNCTION THERAPY: CPT

## 2021-01-27 PROCEDURE — 80048 BASIC METABOLIC PNL TOTAL CA: CPT

## 2021-01-27 PROCEDURE — 97112 NEUROMUSCULAR REEDUCATION: CPT

## 2021-01-27 PROCEDURE — 99366 TEAM CONF W/PAT BY HC PROF: CPT

## 2021-01-27 PROCEDURE — 74230 X-RAY XM SWLNG FUNCJ C+: CPT

## 2021-01-27 PROCEDURE — 92523 SPEECH SOUND LANG COMPREHEN: CPT

## 2021-01-27 PROCEDURE — 71045 X-RAY EXAM CHEST 1 VIEW: CPT

## 2021-01-27 PROCEDURE — 85027 COMPLETE CBC AUTOMATED: CPT

## 2021-01-27 PROCEDURE — 83036 HEMOGLOBIN GLYCOSYLATED A1C: CPT

## 2021-01-27 PROCEDURE — U0003: CPT

## 2021-01-27 PROCEDURE — 97535 SELF CARE MNGMENT TRAINING: CPT

## 2021-01-27 PROCEDURE — 97110 THERAPEUTIC EXERCISES: CPT

## 2021-01-27 PROCEDURE — 86803 HEPATITIS C AB TEST: CPT

## 2021-01-27 PROCEDURE — 92507 TX SP LANG VOICE COMM INDIV: CPT

## 2021-09-28 NOTE — DIETITIAN INITIAL EVALUATION ADULT. - FEEDING ASSISTANCE
AUDIOLOGY REPORT - Hearing Aid Check     SUBJECTIVE:  Sally Ware is a 64 year old year old female, seen today for a hearing aid check at [facility]. Patient reports the right hearing aid is functioning appropriately and she is here for maintenance. Requested replacement batteries, size 312, for use in her right hearing aid.      Previous results have revealed a unilateral severe to mild to normal to mild to severe mixed hearing loss.  Sally was fit with a right Oticon OPN 2 mini-RITE-T hearing aid 1/3/2020.     OBJECTIVE:  Removed wax filter in the earmold, vacuumed secondary filter in the  (did not remove from the earmold). Vacuumed medardo ports. Biologic listening check revealed appropriate function.  The earmold filter was moderately soiled.    Real ear measures were obtained at user settings. There was an excellent match to NALNL-2 prescriptive targets from 500 Hz to 6000 Hz with a 65 dB input.     Sally requested replacement size 312 batteries for use in her hearing aid dispensed 24 batteries.     PLAN:   Return to clinic in six months for hearing aid maintenance and programming, sooner if there are problems. Advised of the hearing aid drop off service if she is not able to get an appointment and needs hearing aid maintenance urgently.     Ruthie Perrin.  [place] Licensed Audiologist #[id number]  
Set Up Needed

## 2024-07-26 NOTE — PATIENT PROFILE ADULT - NSPROMEDSADMININFO_GEN_A_NUR
[Fair] :  ~his/her~ mood as fair [No] : No [No falls in past year] : Patient reported no falls in the past year [0] : 2) Feeling down, depressed, or hopeless: Not at all (0) [PHQ-2 Negative - No further assessment needed] : PHQ-2 Negative - No further assessment needed [Former] : Former [Patient reported colonoscopy was normal] : Patient reported colonoscopy was normal [HIV test declined] : HIV test declined [Hepatitis C test declined] : Hepatitis C test declined [Fully functional (bathing, dressing, toileting, transferring, walking, feeding)] : Fully functional (bathing, dressing, toileting, transferring, walking, feeding) [Fully functional (using the telephone, shopping, preparing meals, housekeeping, doing laundry, using] : Fully functional and needs no help or supervision to perform IADLs (using the telephone, shopping, preparing meals, housekeeping, doing laundry, using transportation, managing medications and managing finances) [With Patient/Caregiver] : , with patient/caregiver [[id number]] : 0 [MammogramDate] : 2022 [ColonoscopyComments] : ref given [AdvancecareDate] : 07/2024 no concerns

## 2024-11-26 NOTE — PROGRESS NOTE ADULT - SUBJECTIVE AND OBJECTIVE BOX
No overnight events.  participating with therapy     REVIEW OF SYSTEMS  Constitutional - No fever,  No fatigue  Neurological - No headaches, No loss of strength  Musculoskeletal - No joint pain, No joint swelling, No muscle pain    VITALS  T(C): 36.8 (12-27-20 @ 07:35), Max: 37 (12-26-20 @ 20:17)  HR: 69 (12-27-20 @ 07:35) (66 - 72)  BP: 110/73 (12-27-20 @ 07:35) (107/67 - 113/69)  RR: 16 (12-27-20 @ 07:35) (16 - 16)  SpO2: 96% (12-27-20 @ 07:35) (96% - 98%)  Wt(kg): --       MEDICATIONS   acetaminophen   Tablet .. 650 milliGRAM(s) every 6 hours PRN  ALBUTerol    90 MICROgram(s) HFA Inhaler 1 Puff(s) every 4 hours PRN  amantadine 100 milliGRAM(s) daily  aspirin enteric coated 81 milliGRAM(s) daily  atorvastatin 80 milliGRAM(s) at bedtime  dextrose 40% Gel 15 Gram(s) once  dextrose 5%. 1000 milliLiter(s) <Continuous>  dextrose 5%. 1000 milliLiter(s) <Continuous>  dextrose 50% Injectable 25 Gram(s) once  dextrose 50% Injectable 12.5 Gram(s) once  dextrose 50% Injectable 25 Gram(s) once  enoxaparin Injectable 40 milliGRAM(s) daily  glucagon  Injectable 1 milliGRAM(s) once  insulin glargine Injectable (LANTUS) 40 Unit(s) at bedtime  insulin lispro (ADMELOG) corrective regimen sliding scale   at bedtime  insulin lispro (ADMELOG) corrective regimen sliding scale   three times a day before meals  insulin lispro Injectable (ADMELOG) 10 Unit(s) three times a day before meals  levothyroxine 75 MICROGram(s) daily  lisinopril 40 milliGRAM(s) daily  multivitamin 1 Tablet(s) daily  pantoprazole    Tablet 40 milliGRAM(s) before breakfast  polyethylene glycol 3350 17 Gram(s) daily PRN  senna 2 Tablet(s) at bedtime  ticagrelor 90 milliGRAM(s) two times a day      RECENT LABS/IMAGING                      POCT Blood Glucose.: 185 mg/dL (12-27-20 @ 07:35)  POCT Blood Glucose.: 243 mg/dL (12-26-20 @ 21:28)  POCT Blood Glucose.: 214 mg/dL (12-26-20 @ 16:59)    ---------      PHYSICAL EXAM  Constitutional - NAD, Comfortable, in chair    Pulm - Breathing comfortably  Abd - Soft, NTND  Extremities - No edema, No calf tenderness  Neurologic Exam -                    Cognitive - Awake, Alert     Communication - Fluent     Motor - No new deficits     Sensory - Intact to LT  Psychiatric - Mood WNL, Affect WNL    ASSESSMENT/PLAN  65y Female with functional deficits after left pontine stroke   on ASA, ticagrelor, atorvastatin  left upper and bilateral lower extremity weakness, dysarthria, dysphagia, and cognitive impairment  dysphagia, on soft solids/nectar thick liquids   continue Comprehensive Rehab Program of PT/OT/SLP  3 hours a day for 5 days a week  Continue current medical management  amantadine for attention  Pain - Tylenol PRN  DVT PPX - lovenox   labs monday   Continue 3hrs a day of comprehensive rehab program.            No